# Patient Record
Sex: MALE | Race: WHITE | NOT HISPANIC OR LATINO | Employment: UNEMPLOYED | URBAN - METROPOLITAN AREA
[De-identification: names, ages, dates, MRNs, and addresses within clinical notes are randomized per-mention and may not be internally consistent; named-entity substitution may affect disease eponyms.]

---

## 2021-07-21 ENCOUNTER — TRANSCRIBE ORDERS (OUTPATIENT)
Dept: ADMINISTRATIVE | Facility: HOSPITAL | Age: 66
End: 2021-07-21

## 2021-07-21 ENCOUNTER — LAB (OUTPATIENT)
Dept: LAB | Facility: HOSPITAL | Age: 66
End: 2021-07-21

## 2021-07-21 DIAGNOSIS — E55.9 VITAMIN D DEFICIENCY: ICD-10-CM

## 2021-07-21 DIAGNOSIS — Z13.220 ENCOUNTER FOR SCREENING FOR LIPOID DISORDERS: ICD-10-CM

## 2021-07-21 DIAGNOSIS — R53.83 OTHER FATIGUE: Primary | ICD-10-CM

## 2021-07-21 DIAGNOSIS — R53.83 OTHER FATIGUE: ICD-10-CM

## 2021-07-21 DIAGNOSIS — Z12.5 ENCOUNTER FOR SCREENING FOR MALIGNANT NEOPLASM OF PROSTATE: ICD-10-CM

## 2021-07-21 LAB
25(OH)D3 SERPL-MCNC: 27.8 NG/ML
ALBUMIN SERPL-MCNC: 3.6 G/DL (ref 3.5–5.2)
ALBUMIN/GLOB SERPL: 1.2 G/DL
ALP SERPL-CCNC: 115 U/L (ref 39–117)
ALT SERPL W P-5'-P-CCNC: 10 U/L (ref 1–41)
ANION GAP SERPL CALCULATED.3IONS-SCNC: 10.3 MMOL/L (ref 5–15)
AST SERPL-CCNC: 20 U/L (ref 1–40)
BASOPHILS # BLD AUTO: 0.08 10*3/MM3 (ref 0–0.2)
BASOPHILS NFR BLD AUTO: 1.2 % (ref 0–1.5)
BILIRUB SERPL-MCNC: 0.3 MG/DL (ref 0–1.2)
BUN SERPL-MCNC: 12 MG/DL (ref 8–23)
BUN/CREAT SERPL: 18.2 (ref 7–25)
CALCIUM SPEC-SCNC: 8.6 MG/DL (ref 8.6–10.5)
CHLORIDE SERPL-SCNC: 100 MMOL/L (ref 98–107)
CHOLEST SERPL-MCNC: 165 MG/DL (ref 0–200)
CO2 SERPL-SCNC: 25.7 MMOL/L (ref 22–29)
CREAT SERPL-MCNC: 0.66 MG/DL (ref 0.76–1.27)
DEPRECATED RDW RBC AUTO: 45.5 FL (ref 37–54)
EOSINOPHIL # BLD AUTO: 0.19 10*3/MM3 (ref 0–0.4)
EOSINOPHIL NFR BLD AUTO: 2.9 % (ref 0.3–6.2)
ERYTHROCYTE [DISTWIDTH] IN BLOOD BY AUTOMATED COUNT: 13.5 % (ref 12.3–15.4)
GFR SERPL CREATININE-BSD FRML MDRD: 121 ML/MIN/1.73
GLOBULIN UR ELPH-MCNC: 3 GM/DL
GLUCOSE SERPL-MCNC: 82 MG/DL (ref 65–99)
HCT VFR BLD AUTO: 46.8 % (ref 37.5–51)
HDLC SERPL-MCNC: 67 MG/DL (ref 40–60)
HGB BLD-MCNC: 15.1 G/DL (ref 13–17.7)
IMM GRANULOCYTES # BLD AUTO: 0.03 10*3/MM3 (ref 0–0.05)
IMM GRANULOCYTES NFR BLD AUTO: 0.5 % (ref 0–0.5)
LDLC SERPL CALC-MCNC: 80 MG/DL (ref 0–100)
LDLC/HDLC SERPL: 1.16 {RATIO}
LYMPHOCYTES # BLD AUTO: 1.47 10*3/MM3 (ref 0.7–3.1)
LYMPHOCYTES NFR BLD AUTO: 22.7 % (ref 19.6–45.3)
MCH RBC QN AUTO: 29.1 PG (ref 26.6–33)
MCHC RBC AUTO-ENTMCNC: 32.3 G/DL (ref 31.5–35.7)
MCV RBC AUTO: 90.2 FL (ref 79–97)
MONOCYTES # BLD AUTO: 0.58 10*3/MM3 (ref 0.1–0.9)
MONOCYTES NFR BLD AUTO: 9 % (ref 5–12)
NEUTROPHILS NFR BLD AUTO: 4.12 10*3/MM3 (ref 1.7–7)
NEUTROPHILS NFR BLD AUTO: 63.7 % (ref 42.7–76)
NRBC BLD AUTO-RTO: 0 /100 WBC (ref 0–0.2)
PLATELET # BLD AUTO: 218 10*3/MM3 (ref 140–450)
PMV BLD AUTO: 11 FL (ref 6–12)
POTASSIUM SERPL-SCNC: 4.3 MMOL/L (ref 3.5–5.2)
PROT SERPL-MCNC: 6.6 G/DL (ref 6–8.5)
PSA SERPL-MCNC: 0.24 NG/ML (ref 0–4)
RBC # BLD AUTO: 5.19 10*6/MM3 (ref 4.14–5.8)
SODIUM SERPL-SCNC: 136 MMOL/L (ref 136–145)
T4 FREE SERPL-MCNC: 1.53 NG/DL (ref 0.93–1.7)
TRIGL SERPL-MCNC: 103 MG/DL (ref 0–150)
TSH SERPL DL<=0.05 MIU/L-ACNC: 0.74 UIU/ML (ref 0.27–4.2)
VIT B12 BLD-MCNC: 505 PG/ML (ref 211–946)
VLDLC SERPL-MCNC: 18 MG/DL (ref 5–40)
WBC # BLD AUTO: 6.47 10*3/MM3 (ref 3.4–10.8)

## 2021-07-21 PROCEDURE — 84443 ASSAY THYROID STIM HORMONE: CPT

## 2021-07-21 PROCEDURE — 80061 LIPID PANEL: CPT

## 2021-07-21 PROCEDURE — G0103 PSA SCREENING: HCPCS

## 2021-07-21 PROCEDURE — 84439 ASSAY OF FREE THYROXINE: CPT

## 2021-07-21 PROCEDURE — 82607 VITAMIN B-12: CPT

## 2021-07-21 PROCEDURE — 36415 COLL VENOUS BLD VENIPUNCTURE: CPT

## 2021-07-21 PROCEDURE — 82306 VITAMIN D 25 HYDROXY: CPT

## 2021-07-21 PROCEDURE — 85025 COMPLETE CBC W/AUTO DIFF WBC: CPT

## 2021-07-21 PROCEDURE — 80053 COMPREHEN METABOLIC PANEL: CPT

## 2021-12-26 ENCOUNTER — HOSPITAL ENCOUNTER (EMERGENCY)
Facility: HOSPITAL | Age: 66
Discharge: HOME | End: 2021-12-27
Attending: EMERGENCY MEDICINE | Admitting: EMERGENCY MEDICINE

## 2021-12-26 DIAGNOSIS — T83.090A: Primary | ICD-10-CM

## 2021-12-26 PROCEDURE — 99283 EMERGENCY DEPT VISIT LOW MDM: CPT

## 2021-12-27 VITALS
OXYGEN SATURATION: 97 % | HEART RATE: 68 BPM | SYSTOLIC BLOOD PRESSURE: 128 MMHG | DIASTOLIC BLOOD PRESSURE: 68 MMHG | WEIGHT: 140 LBS | BODY MASS INDEX: 23.9 KG/M2 | TEMPERATURE: 98.3 F | HEIGHT: 64 IN | RESPIRATION RATE: 17 BRPM

## 2022-08-16 ENCOUNTER — APPOINTMENT (OUTPATIENT)
Dept: GENERAL RADIOLOGY | Facility: HOSPITAL | Age: 67
End: 2022-08-16

## 2022-08-16 ENCOUNTER — APPOINTMENT (OUTPATIENT)
Dept: CARDIOLOGY | Facility: HOSPITAL | Age: 67
End: 2022-08-16

## 2022-08-16 ENCOUNTER — HOSPITAL ENCOUNTER (INPATIENT)
Facility: HOSPITAL | Age: 67
LOS: 2 days | Discharge: NURSING, REHAB OR LONG-TERM CARE | End: 2022-08-19
Attending: EMERGENCY MEDICINE | Admitting: INTERNAL MEDICINE

## 2022-08-16 DIAGNOSIS — I50.9 ACUTE CONGESTIVE HEART FAILURE, UNSPECIFIED HEART FAILURE TYPE: ICD-10-CM

## 2022-08-16 DIAGNOSIS — J96.01 ACUTE HYPOXEMIC RESPIRATORY FAILURE: Primary | ICD-10-CM

## 2022-08-16 DIAGNOSIS — N39.0 ACUTE UTI: ICD-10-CM

## 2022-08-16 PROBLEM — K21.9 GASTROESOPHAGEAL REFLUX DISEASE WITHOUT ESOPHAGITIS: Chronic | Status: ACTIVE | Noted: 2021-09-17

## 2022-08-16 PROBLEM — D64.9 ANEMIA: Status: ACTIVE | Noted: 2022-08-16

## 2022-08-16 PROBLEM — Z93.59 PRESENCE OF SUPRAPUBIC CATHETER (HCC): Status: ACTIVE | Noted: 2022-02-07

## 2022-08-16 PROBLEM — K21.9 GASTROESOPHAGEAL REFLUX DISEASE WITHOUT ESOPHAGITIS: Status: ACTIVE | Noted: 2021-09-17

## 2022-08-16 PROBLEM — G82.50 TETRAPLEGIA: Chronic | Status: ACTIVE | Noted: 2021-09-17

## 2022-08-16 PROBLEM — E87.0 HYPERNATREMIA: Status: ACTIVE | Noted: 2022-08-16

## 2022-08-16 PROBLEM — E55.9 VITAMIN D DEFICIENCY: Status: ACTIVE | Noted: 2021-09-17

## 2022-08-16 PROBLEM — G82.50 TETRAPLEGIA (HCC): Status: ACTIVE | Noted: 2021-09-17

## 2022-08-16 LAB
ALBUMIN SERPL-MCNC: 3.4 G/DL (ref 3.5–5.2)
ALBUMIN/GLOB SERPL: 1.1 G/DL
ALP SERPL-CCNC: 124 U/L (ref 39–117)
ALT SERPL W P-5'-P-CCNC: 17 U/L (ref 1–41)
ANION GAP SERPL CALCULATED.3IONS-SCNC: 7 MMOL/L (ref 5–15)
ARTERIAL PATENCY WRIST A: POSITIVE
AST SERPL-CCNC: 17 U/L (ref 1–40)
ATMOSPHERIC PRESS: ABNORMAL MM[HG]
B PARAPERT DNA SPEC QL NAA+PROBE: NOT DETECTED
B PERT DNA SPEC QL NAA+PROBE: NOT DETECTED
BACTERIA UR QL AUTO: ABNORMAL /HPF
BASE EXCESS BLDA CALC-SCNC: 11.7 MMOL/L (ref 0–3)
BASOPHILS # BLD AUTO: 0 10*3/MM3 (ref 0–0.2)
BASOPHILS NFR BLD AUTO: 0.3 % (ref 0–1.5)
BDY SITE: ABNORMAL
BH CV ECHO MEAS - AO MAX PG: 8.2 MMHG
BH CV ECHO MEAS - AO MEAN PG: 4.6 MMHG
BH CV ECHO MEAS - AO ROOT DIAM: 3.5 CM
BH CV ECHO MEAS - AO V2 MAX: 143.5 CM/SEC
BH CV ECHO MEAS - AO V2 VTI: 24.9 CM
BH CV ECHO MEAS - AVA(I,D): 2.24 CM2
BH CV ECHO MEAS - EDV(MOD-SP4): 80.1 ML
BH CV ECHO MEAS - EF(MOD-BP): 63 %
BH CV ECHO MEAS - EF(MOD-SP4): 62.6 %
BH CV ECHO MEAS - ESV(MOD-SP4): 29.9 ML
BH CV ECHO MEAS - LA DIMENSION: 3.3 CM
BH CV ECHO MEAS - LV DIASTOLIC VOL/BSA (35-75): 46.1 CM2
BH CV ECHO MEAS - LV MAX PG: 2.46 MMHG
BH CV ECHO MEAS - LV MEAN PG: 1.58 MMHG
BH CV ECHO MEAS - LV SYSTOLIC VOL/BSA (12-30): 17.2 CM2
BH CV ECHO MEAS - LV V1 MAX: 78.4 CM/SEC
BH CV ECHO MEAS - LV V1 VTI: 15.9 CM
BH CV ECHO MEAS - LVOT AREA: 3.5 CM2
BH CV ECHO MEAS - LVOT DIAM: 2.11 CM
BH CV ECHO MEAS - MV A MAX VEL: 70.8 CM/SEC
BH CV ECHO MEAS - MV DEC SLOPE: 340.4 CM/SEC2
BH CV ECHO MEAS - MV DEC TIME: 0.15 MSEC
BH CV ECHO MEAS - MV E MAX VEL: 52.3 CM/SEC
BH CV ECHO MEAS - MV E/A: 0.74
BH CV ECHO MEAS - MV MAX PG: 4.8 MMHG
BH CV ECHO MEAS - MV MEAN PG: 1.63 MMHG
BH CV ECHO MEAS - MV V2 VTI: 19.2 CM
BH CV ECHO MEAS - MVA(VTI): 2.9 CM2
BH CV ECHO MEAS - PA V2 MAX: 114.3 CM/SEC
BH CV ECHO MEAS - RAP SYSTOLE: 15 MMHG
BH CV ECHO MEAS - RV MAX PG: 1.03 MMHG
BH CV ECHO MEAS - RV V1 MAX: 50.8 CM/SEC
BH CV ECHO MEAS - RV V1 VTI: 10.6 CM
BH CV ECHO MEAS - RVSP: 45.7 MMHG
BH CV ECHO MEAS - SI(MOD-SP4): 28.9 ML/M2
BH CV ECHO MEAS - SV(LVOT): 55.9 ML
BH CV ECHO MEAS - SV(MOD-SP4): 50.2 ML
BH CV ECHO MEAS - TR MAX PG: 30.7 MMHG
BH CV ECHO MEAS - TR MAX VEL: 277 CM/SEC
BILIRUB SERPL-MCNC: 0.3 MG/DL (ref 0–1.2)
BILIRUB UR QL STRIP: NEGATIVE
BUN SERPL-MCNC: 18 MG/DL (ref 8–23)
BUN/CREAT SERPL: 25.7 (ref 7–25)
C PNEUM DNA NPH QL NAA+NON-PROBE: NOT DETECTED
CA-I BLDA-SCNC: 1.23 MMOL/L (ref 1.15–1.33)
CALCIUM SPEC-SCNC: 9.2 MG/DL (ref 8.6–10.5)
CHLORIDE SERPL-SCNC: 99 MMOL/L (ref 98–107)
CLARITY UR: ABNORMAL
CO2 BLDA-SCNC: 43.2 MMOL/L (ref 22–29)
CO2 SERPL-SCNC: 41 MMOL/L (ref 22–29)
COLOR UR: YELLOW
CREAT SERPL-MCNC: 0.7 MG/DL (ref 0.76–1.27)
CRP SERPL-MCNC: 1.39 MG/DL (ref 0–0.5)
D-LACTATE SERPL-SCNC: 0.6 MMOL/L (ref 0.5–2)
D-LACTATE SERPL-SCNC: <0.3 MMOL/L (ref 0.5–2)
DEPRECATED RDW RBC AUTO: 43.8 FL (ref 37–54)
EGFRCR SERPLBLD CKD-EPI 2021: 101.6 ML/MIN/1.73
EOSINOPHIL # BLD AUTO: 0.1 10*3/MM3 (ref 0–0.4)
EOSINOPHIL NFR BLD AUTO: 0.7 % (ref 0.3–6.2)
ERYTHROCYTE [DISTWIDTH] IN BLOOD BY AUTOMATED COUNT: 15.4 % (ref 12.3–15.4)
FERRITIN SERPL-MCNC: 9.49 NG/ML (ref 30–400)
FLUAV SUBTYP SPEC NAA+PROBE: NOT DETECTED
FLUBV RNA ISLT QL NAA+PROBE: NOT DETECTED
FOLATE SERPL-MCNC: >20 NG/ML (ref 4.78–24.2)
GLOBULIN UR ELPH-MCNC: 3.2 GM/DL
GLUCOSE BLDC GLUCOMTR-MCNC: 80 MG/DL (ref 74–100)
GLUCOSE BLDC GLUCOMTR-MCNC: 80 MG/DL (ref 74–100)
GLUCOSE SERPL-MCNC: 80 MG/DL (ref 65–99)
GLUCOSE UR STRIP-MCNC: NEGATIVE MG/DL
HADV DNA SPEC NAA+PROBE: NOT DETECTED
HCO3 BLDA-SCNC: 40.8 MMOL/L (ref 21–28)
HCOV 229E RNA SPEC QL NAA+PROBE: NOT DETECTED
HCOV HKU1 RNA SPEC QL NAA+PROBE: NOT DETECTED
HCOV NL63 RNA SPEC QL NAA+PROBE: NOT DETECTED
HCOV OC43 RNA SPEC QL NAA+PROBE: NOT DETECTED
HCT VFR BLD AUTO: 35.7 % (ref 37.5–51)
HCT VFR BLDA CALC: 38 % (ref 38–51)
HEMODILUTION: NO
HGB BLD-MCNC: 10.9 G/DL (ref 13–17.7)
HGB BLDA-MCNC: 12.9 G/DL (ref 12–17)
HGB UR QL STRIP.AUTO: NEGATIVE
HMPV RNA NPH QL NAA+NON-PROBE: NOT DETECTED
HOLD SPECIMEN: NORMAL
HPIV1 RNA ISLT QL NAA+PROBE: NOT DETECTED
HPIV2 RNA SPEC QL NAA+PROBE: NOT DETECTED
HPIV3 RNA NPH QL NAA+PROBE: NOT DETECTED
HPIV4 P GENE NPH QL NAA+PROBE: NOT DETECTED
HYALINE CASTS UR QL AUTO: ABNORMAL /LPF
INHALED O2 CONCENTRATION: 32 %
IRON 24H UR-MRATE: 24 MCG/DL (ref 59–158)
KETONES UR QL STRIP: ABNORMAL
L PNEUMO1 AG UR QL IA: NEGATIVE
LEUKOCYTE ESTERASE UR QL STRIP.AUTO: ABNORMAL
LYMPHOCYTES # BLD AUTO: 1.7 10*3/MM3 (ref 0.7–3.1)
LYMPHOCYTES NFR BLD AUTO: 22 % (ref 19.6–45.3)
M PNEUMO IGG SER IA-ACNC: NOT DETECTED
MAXIMAL PREDICTED HEART RATE: 154 BPM
MCH RBC QN AUTO: 24.1 PG (ref 26.6–33)
MCHC RBC AUTO-ENTMCNC: 30.5 G/DL (ref 31.5–35.7)
MCV RBC AUTO: 79 FL (ref 79–97)
MODALITY: ABNORMAL
MONOCYTES # BLD AUTO: 1 10*3/MM3 (ref 0.1–0.9)
MONOCYTES NFR BLD AUTO: 12.6 % (ref 5–12)
MUCOUS THREADS URNS QL MICRO: ABNORMAL /HPF
NEUTROPHILS NFR BLD AUTO: 4.9 10*3/MM3 (ref 1.7–7)
NEUTROPHILS NFR BLD AUTO: 64.4 % (ref 42.7–76)
NITRITE UR QL STRIP: NEGATIVE
NRBC BLD AUTO-RTO: 0 /100 WBC (ref 0–0.2)
NT-PROBNP SERPL-MCNC: 2386 PG/ML (ref 0–900)
NT-PROBNP SERPL-MCNC: 2525 PG/ML (ref 0–900)
OSMOLALITY SERPL: 301 MOSM/KG (ref 280–301)
OSMOLALITY UR: 442 MOSM/KG (ref 300–800)
PCO2 BLDA: 76.7 MM HG (ref 35–48)
PH BLDA: 7.33 PH UNITS (ref 7.35–7.45)
PH UR STRIP.AUTO: 6 [PH] (ref 5–8)
PLATELET # BLD AUTO: 232 10*3/MM3 (ref 140–450)
PMV BLD AUTO: 8.7 FL (ref 6–12)
PO2 BLDA: 82.9 MM HG (ref 83–108)
POTASSIUM BLDA-SCNC: 3.9 MMOL/L (ref 3.5–4.5)
POTASSIUM SERPL-SCNC: 4.3 MMOL/L (ref 3.5–5.2)
PROCALCITONIN SERPL-MCNC: 0.05 NG/ML (ref 0–0.25)
PROT SERPL-MCNC: 6.6 G/DL (ref 6–8.5)
PROT UR QL STRIP: NEGATIVE
QT INTERVAL: 381 MS
RBC # BLD AUTO: 4.52 10*6/MM3 (ref 4.14–5.8)
RBC # UR STRIP: ABNORMAL /HPF
REF LAB TEST METHOD: ABNORMAL
RHINOVIRUS RNA SPEC NAA+PROBE: NOT DETECTED
RSV RNA NPH QL NAA+NON-PROBE: NOT DETECTED
S PNEUM AG SPEC QL LA: NEGATIVE
SAO2 % BLDCOA: 94.6 % (ref 94–98)
SARS-COV-2 RNA NPH QL NAA+NON-PROBE: NOT DETECTED
SODIUM BLD-SCNC: 145 MMOL/L (ref 138–146)
SODIUM SERPL-SCNC: 147 MMOL/L (ref 136–145)
SODIUM UR-SCNC: 157 MMOL/L
SP GR UR STRIP: 1.02 (ref 1–1.03)
SQUAMOUS #/AREA URNS HPF: ABNORMAL /HPF
STRESS TARGET HR: 131 BPM
TROPONIN T SERPL-MCNC: <0.01 NG/ML (ref 0–0.03)
UROBILINOGEN UR QL STRIP: ABNORMAL
VIT B12 BLD-MCNC: 570 PG/ML (ref 211–946)
WBC # UR STRIP: ABNORMAL /HPF
WBC NRBC COR # BLD: 7.6 10*3/MM3 (ref 3.4–10.8)
WHOLE BLOOD HOLD COAG: NORMAL
WHOLE BLOOD HOLD COAG: NORMAL

## 2022-08-16 PROCEDURE — 83880 ASSAY OF NATRIURETIC PEPTIDE: CPT | Performed by: NURSE PRACTITIONER

## 2022-08-16 PROCEDURE — 92610 EVALUATE SWALLOWING FUNCTION: CPT

## 2022-08-16 PROCEDURE — 85018 HEMOGLOBIN: CPT

## 2022-08-16 PROCEDURE — 82746 ASSAY OF FOLIC ACID SERUM: CPT | Performed by: NURSE PRACTITIONER

## 2022-08-16 PROCEDURE — 0202U NFCT DS 22 TRGT SARS-COV-2: CPT | Performed by: EMERGENCY MEDICINE

## 2022-08-16 PROCEDURE — 87186 SC STD MICRODIL/AGAR DIL: CPT | Performed by: EMERGENCY MEDICINE

## 2022-08-16 PROCEDURE — 25010000002 ENOXAPARIN PER 10 MG: Performed by: NURSE PRACTITIONER

## 2022-08-16 PROCEDURE — 86140 C-REACTIVE PROTEIN: CPT | Performed by: NURSE PRACTITIONER

## 2022-08-16 PROCEDURE — 99285 EMERGENCY DEPT VISIT HI MDM: CPT

## 2022-08-16 PROCEDURE — 93005 ELECTROCARDIOGRAM TRACING: CPT | Performed by: EMERGENCY MEDICINE

## 2022-08-16 PROCEDURE — 83880 ASSAY OF NATRIURETIC PEPTIDE: CPT | Performed by: EMERGENCY MEDICINE

## 2022-08-16 PROCEDURE — 82728 ASSAY OF FERRITIN: CPT | Performed by: NURSE PRACTITIONER

## 2022-08-16 PROCEDURE — G0378 HOSPITAL OBSERVATION PER HR: HCPCS

## 2022-08-16 PROCEDURE — 36415 COLL VENOUS BLD VENIPUNCTURE: CPT | Performed by: EMERGENCY MEDICINE

## 2022-08-16 PROCEDURE — 92611 MOTION FLUOROSCOPY/SWALLOW: CPT

## 2022-08-16 PROCEDURE — 93005 ELECTROCARDIOGRAM TRACING: CPT

## 2022-08-16 PROCEDURE — 93306 TTE W/DOPPLER COMPLETE: CPT | Performed by: INTERNAL MEDICINE

## 2022-08-16 PROCEDURE — 84145 PROCALCITONIN (PCT): CPT | Performed by: EMERGENCY MEDICINE

## 2022-08-16 PROCEDURE — 93306 TTE W/DOPPLER COMPLETE: CPT

## 2022-08-16 PROCEDURE — 87449 NOS EACH ORGANISM AG IA: CPT | Performed by: NURSE PRACTITIONER

## 2022-08-16 PROCEDURE — 25010000002 CEFTRIAXONE PER 250 MG: Performed by: NURSE PRACTITIONER

## 2022-08-16 PROCEDURE — 36600 WITHDRAWAL OF ARTERIAL BLOOD: CPT

## 2022-08-16 PROCEDURE — 83605 ASSAY OF LACTIC ACID: CPT

## 2022-08-16 PROCEDURE — 25010000002 FUROSEMIDE PER 20 MG: Performed by: NURSE PRACTITIONER

## 2022-08-16 PROCEDURE — 87147 CULTURE TYPE IMMUNOLOGIC: CPT | Performed by: EMERGENCY MEDICINE

## 2022-08-16 PROCEDURE — 84484 ASSAY OF TROPONIN QUANT: CPT | Performed by: EMERGENCY MEDICINE

## 2022-08-16 PROCEDURE — 83540 ASSAY OF IRON: CPT | Performed by: NURSE PRACTITIONER

## 2022-08-16 PROCEDURE — 82803 BLOOD GASES ANY COMBINATION: CPT

## 2022-08-16 PROCEDURE — 25010000002 PIPERACILLIN SOD-TAZOBACTAM PER 1 G: Performed by: EMERGENCY MEDICINE

## 2022-08-16 PROCEDURE — 36415 COLL VENOUS BLD VENIPUNCTURE: CPT

## 2022-08-16 PROCEDURE — 25010000002 FUROSEMIDE PER 20 MG: Performed by: EMERGENCY MEDICINE

## 2022-08-16 PROCEDURE — 87040 BLOOD CULTURE FOR BACTERIA: CPT | Performed by: EMERGENCY MEDICINE

## 2022-08-16 PROCEDURE — 81001 URINALYSIS AUTO W/SCOPE: CPT | Performed by: EMERGENCY MEDICINE

## 2022-08-16 PROCEDURE — 83930 ASSAY OF BLOOD OSMOLALITY: CPT | Performed by: NURSE PRACTITIONER

## 2022-08-16 PROCEDURE — 71045 X-RAY EXAM CHEST 1 VIEW: CPT

## 2022-08-16 PROCEDURE — 84300 ASSAY OF URINE SODIUM: CPT | Performed by: NURSE PRACTITIONER

## 2022-08-16 PROCEDURE — 87086 URINE CULTURE/COLONY COUNT: CPT | Performed by: EMERGENCY MEDICINE

## 2022-08-16 PROCEDURE — 82330 ASSAY OF CALCIUM: CPT

## 2022-08-16 PROCEDURE — 87150 DNA/RNA AMPLIFIED PROBE: CPT | Performed by: EMERGENCY MEDICINE

## 2022-08-16 PROCEDURE — 74230 X-RAY XM SWLNG FUNCJ C+: CPT

## 2022-08-16 PROCEDURE — 80051 ELECTROLYTE PANEL: CPT

## 2022-08-16 PROCEDURE — P9612 CATHETERIZE FOR URINE SPEC: HCPCS

## 2022-08-16 PROCEDURE — 82607 VITAMIN B-12: CPT | Performed by: NURSE PRACTITIONER

## 2022-08-16 PROCEDURE — 99223 1ST HOSP IP/OBS HIGH 75: CPT | Performed by: INTERNAL MEDICINE

## 2022-08-16 PROCEDURE — 85025 COMPLETE CBC W/AUTO DIFF WBC: CPT | Performed by: EMERGENCY MEDICINE

## 2022-08-16 PROCEDURE — 82962 GLUCOSE BLOOD TEST: CPT

## 2022-08-16 PROCEDURE — 84484 ASSAY OF TROPONIN QUANT: CPT | Performed by: NURSE PRACTITIONER

## 2022-08-16 PROCEDURE — 83935 ASSAY OF URINE OSMOLALITY: CPT | Performed by: NURSE PRACTITIONER

## 2022-08-16 PROCEDURE — 80053 COMPREHEN METABOLIC PANEL: CPT | Performed by: EMERGENCY MEDICINE

## 2022-08-16 RX ORDER — ACETAMINOPHEN 650 MG/1
650 SUPPOSITORY RECTAL EVERY 4 HOURS PRN
Status: DISCONTINUED | OUTPATIENT
Start: 2022-08-16 | End: 2022-08-16 | Stop reason: SDUPTHER

## 2022-08-16 RX ORDER — ONDANSETRON 2 MG/ML
4 INJECTION INTRAMUSCULAR; INTRAVENOUS EVERY 6 HOURS PRN
Status: DISCONTINUED | OUTPATIENT
Start: 2022-08-16 | End: 2022-08-19 | Stop reason: HOSPADM

## 2022-08-16 RX ORDER — SODIUM CHLORIDE 0.9 % (FLUSH) 0.9 %
10 SYRINGE (ML) INJECTION AS NEEDED
Status: DISCONTINUED | OUTPATIENT
Start: 2022-08-16 | End: 2022-08-19 | Stop reason: HOSPADM

## 2022-08-16 RX ORDER — ENOXAPARIN SODIUM 100 MG/ML
40 INJECTION SUBCUTANEOUS DAILY
Status: DISCONTINUED | OUTPATIENT
Start: 2022-08-16 | End: 2022-08-19 | Stop reason: HOSPADM

## 2022-08-16 RX ORDER — SODIUM CHLORIDE 0.9 % (FLUSH) 0.9 %
10 SYRINGE (ML) INJECTION AS NEEDED
Status: DISCONTINUED | OUTPATIENT
Start: 2022-08-16 | End: 2022-08-16

## 2022-08-16 RX ORDER — SODIUM CHLORIDE 0.9 % (FLUSH) 0.9 %
10 SYRINGE (ML) INJECTION EVERY 12 HOURS SCHEDULED
Status: DISCONTINUED | OUTPATIENT
Start: 2022-08-16 | End: 2022-08-16

## 2022-08-16 RX ORDER — SODIUM CHLORIDE 0.9 % (FLUSH) 0.9 %
10 SYRINGE (ML) INJECTION EVERY 12 HOURS SCHEDULED
Status: DISCONTINUED | OUTPATIENT
Start: 2022-08-16 | End: 2022-08-19 | Stop reason: HOSPADM

## 2022-08-16 RX ORDER — ZINC OXIDE
1 OINTMENT (GRAM) TOPICAL 2 TIMES DAILY
COMMUNITY

## 2022-08-16 RX ORDER — FUROSEMIDE 10 MG/ML
40 INJECTION INTRAMUSCULAR; INTRAVENOUS ONCE
Status: COMPLETED | OUTPATIENT
Start: 2022-08-16 | End: 2022-08-16

## 2022-08-16 RX ORDER — CHOLECALCIFEROL (VITAMIN D3) 125 MCG
5 CAPSULE ORAL NIGHTLY PRN
Status: DISCONTINUED | OUTPATIENT
Start: 2022-08-16 | End: 2022-08-19 | Stop reason: HOSPADM

## 2022-08-16 RX ORDER — ONDANSETRON 4 MG/1
4 TABLET, FILM COATED ORAL EVERY 6 HOURS PRN
Status: DISCONTINUED | OUTPATIENT
Start: 2022-08-16 | End: 2022-08-19 | Stop reason: HOSPADM

## 2022-08-16 RX ORDER — NITROGLYCERIN 0.4 MG/1
0.4 TABLET SUBLINGUAL
Status: DISCONTINUED | OUTPATIENT
Start: 2022-08-16 | End: 2022-08-19 | Stop reason: HOSPADM

## 2022-08-16 RX ORDER — IPRATROPIUM BROMIDE AND ALBUTEROL SULFATE 2.5; .5 MG/3ML; MG/3ML
3 SOLUTION RESPIRATORY (INHALATION) EVERY 4 HOURS PRN
Status: DISCONTINUED | OUTPATIENT
Start: 2022-08-16 | End: 2022-08-19 | Stop reason: HOSPADM

## 2022-08-16 RX ORDER — PANTOPRAZOLE SODIUM 40 MG/10ML
40 INJECTION, POWDER, LYOPHILIZED, FOR SOLUTION INTRAVENOUS
Status: DISCONTINUED | OUTPATIENT
Start: 2022-08-16 | End: 2022-08-17

## 2022-08-16 RX ORDER — ACETAMINOPHEN 160 MG/5ML
650 SOLUTION ORAL EVERY 4 HOURS PRN
Status: DISCONTINUED | OUTPATIENT
Start: 2022-08-16 | End: 2022-08-16 | Stop reason: SDUPTHER

## 2022-08-16 RX ORDER — ALUMINA, MAGNESIA, AND SIMETHICONE 2400; 2400; 240 MG/30ML; MG/30ML; MG/30ML
15 SUSPENSION ORAL EVERY 6 HOURS PRN
Status: DISCONTINUED | OUTPATIENT
Start: 2022-08-16 | End: 2022-08-19 | Stop reason: HOSPADM

## 2022-08-16 RX ORDER — ACETAMINOPHEN 650 MG/1
650 SUPPOSITORY RECTAL EVERY 4 HOURS PRN
Status: DISCONTINUED | OUTPATIENT
Start: 2022-08-16 | End: 2022-08-19 | Stop reason: HOSPADM

## 2022-08-16 RX ORDER — OMEPRAZOLE 20 MG/1
20 CAPSULE, DELAYED RELEASE ORAL 2 TIMES DAILY
COMMUNITY

## 2022-08-16 RX ORDER — ACETAMINOPHEN 325 MG/1
650 TABLET ORAL EVERY 4 HOURS PRN
Status: DISCONTINUED | OUTPATIENT
Start: 2022-08-16 | End: 2022-08-19 | Stop reason: HOSPADM

## 2022-08-16 RX ORDER — ACETAMINOPHEN 325 MG/1
650 TABLET ORAL EVERY 4 HOURS PRN
Status: DISCONTINUED | OUTPATIENT
Start: 2022-08-16 | End: 2022-08-16 | Stop reason: SDUPTHER

## 2022-08-16 RX ORDER — IPRATROPIUM BROMIDE AND ALBUTEROL SULFATE 2.5; .5 MG/3ML; MG/3ML
3 SOLUTION RESPIRATORY (INHALATION)
COMMUNITY

## 2022-08-16 RX ORDER — ACETAMINOPHEN 160 MG/5ML
650 SOLUTION ORAL EVERY 4 HOURS PRN
Status: DISCONTINUED | OUTPATIENT
Start: 2022-08-16 | End: 2022-08-19 | Stop reason: HOSPADM

## 2022-08-16 RX ORDER — ONDANSETRON 2 MG/ML
4 INJECTION INTRAMUSCULAR; INTRAVENOUS EVERY 6 HOURS PRN
Status: DISCONTINUED | OUTPATIENT
Start: 2022-08-16 | End: 2022-08-16 | Stop reason: SDUPTHER

## 2022-08-16 RX ORDER — BISACODYL 10 MG
10 SUPPOSITORY, RECTAL RECTAL DAILY PRN
Status: DISCONTINUED | OUTPATIENT
Start: 2022-08-16 | End: 2022-08-19 | Stop reason: HOSPADM

## 2022-08-16 RX ORDER — ONDANSETRON 4 MG/1
4 TABLET, FILM COATED ORAL EVERY 6 HOURS PRN
Status: DISCONTINUED | OUTPATIENT
Start: 2022-08-16 | End: 2022-08-16 | Stop reason: SDUPTHER

## 2022-08-16 RX ORDER — FUROSEMIDE 10 MG/ML
40 INJECTION INTRAMUSCULAR; INTRAVENOUS EVERY 8 HOURS
Status: DISPENSED | OUTPATIENT
Start: 2022-08-16 | End: 2022-08-17

## 2022-08-16 RX ORDER — BISACODYL 10 MG
10 SUPPOSITORY, RECTAL RECTAL DAILY PRN
COMMUNITY

## 2022-08-16 RX ADMIN — FUROSEMIDE 40 MG: 10 INJECTION, SOLUTION INTRAMUSCULAR; INTRAVENOUS at 04:20

## 2022-08-16 RX ADMIN — BARIUM SULFATE 50 ML: 400 SUSPENSION ORAL at 13:30

## 2022-08-16 RX ADMIN — ENOXAPARIN SODIUM 40 MG: 100 INJECTION SUBCUTANEOUS at 16:20

## 2022-08-16 RX ADMIN — CEFTRIAXONE 1 G: 1 INJECTION, POWDER, FOR SOLUTION INTRAMUSCULAR; INTRAVENOUS at 10:35

## 2022-08-16 RX ADMIN — FUROSEMIDE 40 MG: 10 INJECTION, SOLUTION INTRAMUSCULAR; INTRAVENOUS at 14:03

## 2022-08-16 RX ADMIN — PIPERACILLIN AND TAZOBACTAM 3.38 G: 3; .375 INJECTION, POWDER, FOR SOLUTION INTRAVENOUS at 05:24
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
## 2022-08-16 NOTE — PLAN OF CARE
Goal Outcome Evaluation:  VFSS completed this date. Pt observed upright in the lateral projection and was given trials of NTL by spoon x1, by straw x2, thin liquids by spoon x1, by straw x2, puree x2, and mechanical ground (mixed) x2. Pt required feeding assistance from SLP coordinator w/ all trials.    Recommend pt initiate a mechanical ground and thin liquid diet. Pt should follow strict aspiration precautions and be a full feed. ST will continue to follow to ensure diet tolerance and make further recs as indicated

## 2022-08-16 NOTE — PLAN OF CARE
Problem: Fall Injury Risk  Goal: Absence of Fall and Fall-Related Injury  Outcome: Ongoing, Progressing     Problem: Pain Acute  Goal: Acceptable Pain Control and Functional Ability  Outcome: Ongoing, Progressing     Problem: Chest Pain  Goal: Resolution of Chest Pain Symptoms  Outcome: Ongoing, Progressing   Goal Outcome Evaluation:      Patient admitted to cdu bed 4, patient awaiting on inpatient room

## 2022-08-16 NOTE — ED NOTES
"RN at bedside to check patient monitor and place pt back on monitor. Pt told RN to \"f--- off\". RN stepped out of room but verified pt was talking and breathing.   "

## 2022-08-16 NOTE — ED NOTES
"RN noted oxygen sats were 75%. Pt nasal cannula was off. RN asked if she could put cannula back on patient and he said \"no\" Pt asking to go home. Pt asking when he can leave. Pt educated that his oxygen is 75% and educated on the risks of low oxygen. Pt agreed to let RN put oxygen back on. Pt informed his provider will be in to see him.  "

## 2022-08-16 NOTE — ED NOTES
Called [facility] and rehab for report on patient. Rehab nurse reports patient has been wearing oxygen for 3-4 days at 2 Liters, also that today patient became lethargic, sweaty, SOA, and chest pain. [facility] rehab reports giving a one time dose 40mg of lasix yesterday.
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
## 2022-08-16 NOTE — MBS/VFSS/FEES
Acute Care - Speech Language Pathology   Swallow Initial Evaluation  [place]     Patient Name: Chris Vizcarra  : 1955  MRN: [id number]  Today's Date: 2022               Admit Date: 2022    Visit Dx:     ICD-10-CM ICD-9-CM   1. Acute hypoxemic respiratory failure (HCC)  J96.01 518.81   2. Acute congestive heart failure, unspecified heart failure type (HCC)  I50.9 428.0   3. Acute UTI  N39.0 599.0     Patient Active Problem List   Diagnosis   • Acute hypoxemic respiratory failure (HCC)   • Gastroesophageal reflux disease without esophagitis   • Presence of suprapubic catheter (HCC)   • Tetraplegia (HCC)   • Vitamin D deficiency   • Anemia   • Hypernatremia   • Acute UTI (urinary tract infection)   • Acute CHF (congestive heart failure) (HCC)     Past Medical History:   Diagnosis Date   • Gastroesophageal reflux disease without esophagitis 2021   • Tetraplegia (HCC) 2021   • Vitamin D deficiency 2021     No past surgical history on file.    SLP Recommendation and Plan  SLP Swallowing Diagnosis: functional oral phase, mild, pharyngeal dysphagia (22)  SLP Diet Recommendation: ground, thin liquids (22)  Recommended Precautions and Strategies: upright posture during/after eating, small bites of food and sips of liquid, alternate between small bites of food and sips of liquid, general aspiration precautions, fatigue precautions, 1:1 supervision, assist with feeding (22)  SLP Rec. for Method of Medication Administration: meds whole, meds crushed, with pudding or applesauce (22)     Monitor for Signs of Aspiration: yes, notify SLP if any concerns (22)     Swallow Criteria for Skilled Therapeutic Interventions Met: demonstrates skilled criteria (22)     Rehab Potential/Prognosis, Swallowing: good, to achieve stated therapy goals (22)  Therapy Frequency (Swallow): PRN (22)  Predicted Duration Therapy  Intervention (Days): until discharge (08/16/22 1443)          SWALLOW EVALUATION (last 72 hours)     SLP Adult Swallow Evaluation     Row Name 08/16/22 1443       Rehab Evaluation    Document Type evaluation  -LF    Subjective Information no complaints  -LF    Patient Observations alert;cooperative;agree to therapy  -LF    Patient Effort good  -LF    Symptoms Noted During/After Treatment none  -LF            General Information    Patient Profile Reviewed yes  -LF    Current Method of Nutrition regular textures;thin liquids  -LF    Prior Level of Function-Swallowing no diet consistency restrictions  -LF    Plans/Goals Discussed with patient;agreed upon  -LF                    Oral Motor Structure and Function    Dentition Assessment upper dentures/partial in place;missing teeth  -LF    Secretion Management WNL/WFL  -LF    Mucosal Quality moist, healthy  -LF            Oral Musculature and Cranial Nerve Assessment    Oral Motor General Assessment WFL  -LF                    MBS/VFSS    Utensils Used spoon;straw  -LF    Consistencies Trialed mixed consistency;pureed;thin liquids;nectar/syrup-thick liquids  -LF            MBS/VFSS Interpretation    VFSS Summary Pt observed upright in the lateral projection and was given trials of NTL by spoon x1, by straw x2, thin liquids by spoon x1, by straw x2, puree x2, and mechanical ground (mixed) x2. Pt required feeding assistance from SLP coordinator w/ all trials.    NECTAR THICK LIQUIDS  Reduced bolus control w/ premature spillage to the vallecula w/ trial by spoon. Oral transit WFL. No penetration or aspiration observed at any time. No oropharyngeal residue noted.    THIN LIQUIDS  Pt takes large sips of thins by straw. Reduced bolus control w/ premature spillage to the pyriforms. Oral transit WFL. In/out penetration observed during the swallow x1 w/ thin liquids by straw. No other penetration/aspiration observed w/ any other trial. Mild pharyngeal residue.    PUREE  Reduced  bolus control w/ premature spillage to the vallecula. Trace in/out penetration observed during the swallow w/ initial trial. No pen/asp observed w/ second trial. Mild vallecular residue.    MECHANICAL SOFT (MIXED)  Reduced bolus control w/ premature spillage of peach juice to the pyriforms. In/out penetration during the swallow observed w/ second trial. No pen/asp observed w/ initial trial. Trace pharyngeal residue.     OVERALL  Adequate hyolaryngeal elevation and reduced epiglottic deflection.    Recommend pt initiate a mechanical ground and thin liquid diet. Pt should follow strict aspiration precautions and be a full feed. ST will continue to follow to ensure diet tolerance and make further recs as indicated.    -            Initiation of Pharyngeal Swallow    Pharyngeal Phase impaired pharyngeal phase of swallowing  -LF    Penetration During the Swallow thin liquids;mixed consistency;pudding/puree  -    Response to Penetration transient  -    Rosenbek's Scale thin:;pudding/puree:;mixed:;2--->level 2  -LF            SLP Evaluation Clinical Impression    SLP Swallowing Diagnosis functional oral phase;mild;pharyngeal dysphagia  -    Functional Impact risk of aspiration/pneumonia;risk of malnutrition  -    Rehab Potential/Prognosis, Swallowing good, to achieve stated therapy goals  -    Swallow Criteria for Skilled Therapeutic Interventions Met demonstrates skilled criteria  -            SLP Treatment Clinical Impressions    Care Plan Review evaluation/treatment results reviewed;care plan/treatment goals reviewed;risks/benefits reviewed;current/potential barriers reviewed;patient/other agree to care plan  -            Recommendations    Therapy Frequency (Swallow) PRN  -LF    Predicted Duration Therapy Intervention (Days) until discharge  -    SLP Diet Recommendation ground;thin liquids  -    Recommended Diagnostics --  -EC    Recommended Precautions and Strategies upright posture during/after  eating;small bites of food and sips of liquid;alternate between small bites of food and sips of liquid;general aspiration precautions;fatigue precautions;1:1 supervision;assist with feeding  -LF    Oral Care Recommendations Oral Care BID/PRN  -LF    SLP Rec. for Method of Medication Administration meds whole;meds crushed;with pudding or applesauce  -LF    Monitor for Signs of Aspiration yes;notify SLP if any concerns  -LF            Swallow Goals (SLP)    Swallow LTGs Swallow Long Term Goal (free text)  -LF    Swallow STGs diet tolerance goal selection (SLP)  -LF    Diet Tolerance Goal Selection (SLP) Swallow Short Term Goal 1;Swallow Short Term Goal 2  -LF            (LTG) Swallow    (LTG) Swallow Pt will maximixe swallow function for least restrictive PO diet, exhibiting no complication associated with dysphagia, adequate PO intake, and demonstrating independent use of swallow compensations  -LF    [place] (Swallow Long Term Goal) independently (over 90% accuracy)  -LF    Time Frame (Swallow Long Term Goal) by discharge  -LF    Comment (Swallow Long Term Goal) see above note  -LF            (STG) Swallow 1    (STG) Swallow 1 The patient will participate in ongoing assessment of swallow, including re-evaluation clinically and/or including instrumental assessment of swallow if indicated, to further assess swallow function in anticipation to initiate a po diet  -LF    [place] (Swallow Short Term Goal 1) with maximum cues (25-49% accuracy)  -LF    Time Frame (Swallow Short Term Goal 1) 1 week  -LF    Progress/Outcomes (Swallow Short Term Goal 1) goal met  -LF    Comment (Swallow Short Term Goal 1) see above note  -LF            (STG) Swallow 2    (STG) Swallow 2 The patient will participate in a full meal assessment to determine safety and adequacy of recommended diet, independent use of safe swallow compensations, and additional goals/recommendations to follow  -LF    Time Frame (Swallow Short Term Goal 2)  other (see comments)  2 days  -LF    Progress/Outcomes (Swallow Short Term Goal 2) new goal  -LF          User Key  (r) = Recorded By, (t) = Taken By, (c) = Cosigned By    Initials Name Effective Dates    EC Ariadne Crawford 06/16/21 -     LF Nat Rose SLP 06/16/21 -                 EDUCATION  The patient has been educated in the following areas:   Dysphagia (Swallowing Impairment) Oral Care/Hydration Modified Diet Instruction.       Patient was not wearing a face mask during this therapy encounter. Therapist used appropriate personal protective equipment including mask, eye protection and gloves.  Mask used was standard procedure mask. Appropriate PPE was worn during the entire therapy session. Hand hygiene was completed before and after therapy session. Patient is not in enhanced droplet precautions.                  Time Calculation:                NIKKI Villa  8/16/2022
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
## 2022-08-16 NOTE — DISCHARGE PLACEMENT REQUEST
"ZackeryGustaob (67 y.o. Male)             Date of Birth   1955    Social Security Number       Address   [facility] [address] [place] [address]    Home Phone       MRN   [id number]       Faith   None    Marital Status   Single                            Admission Date   8/16/22    Admission Type   Emergency    Admitting Provider   Louis Mckinnon MD    Attending Provider   Louis Mckinnon MD    Department, Room/Bed   [facility] EMERGENCY DEPARTMENT, EDH4/4       Discharge Date       Discharge Disposition       Discharge Destination                               Attending Provider: Louis Mckinnon MD    Allergies: No Known Allergies    Isolation: None   Infection: None   Code Status: CPR   Advance Care Planning Activity    Ht: 170.2 cm (67\")   Wt: 63.5 kg (140 lb)    Admission Cmt: None   Principal Problem: Acute hypoxemic respiratory failure (HCC) [J96.01]                 Active Insurance as of 8/16/2022     Primary Coverage     Payor Plan Insurance Group Employer/Plan Group    [facility] MEDICARE REPLACEMENT [facility] MEDICARE REPLACEMENT [id number]     Payor Plan Address Payor Plan Phone Number Payor Plan Fax Number Effective Dates    [address] [address]   5/1/2021 - None Entered    [facility] [id number]       Subscriber Name Subscriber Birth Date Member ID       GUSTABO ALARCON 1955 [id number]           Secondary Coverage     Payor Plan Insurance Group Employer/Plan Group    [place]      Payor Plan Address Payor Plan Phone Number Payor Plan Fax Number Effective Dates    [address]   7/7/2021 - None Entered    [place] [address]       Subscriber Name Subscriber Birth Date Member ID       GUSTABO ALARCON 1955 [id number]                 Emergency Contacts      (Rel.) Home Phone Work Phone Mobile Phone    Perlita Beltran (Sister) -- -- [phone]              "

## 2022-08-16 NOTE — CASE MANAGEMENT/SOCIAL WORK
Discharge Planning Assessment  [facility]     Patient Name: Chris Vizcarra  MRN: [id number]  Today's Date: 8/16/2022    Admit Date: 8/16/2022     Discharge Needs Assessment     Row Name 08/16/22 1347       Living Environment    People in Home facility resident    Name(s) of People in Home [facility]    Current Living Arrangements extended care facility    Primary Care Provided by self    Provides Primary Care For no one    Able to Return to Prior Arrangements yes       Resource/Environmental Concerns    Resource/Environmental Concerns none    Transportation Concerns none       Transition Planning    Patient/Family Anticipates Transition to long-term care facility    Patient/Family Anticipated Services at Transition skilled nursing    Transportation Anticipated health plan transportation       Discharge Needs Assessment    Equipment Currently Used at Home wheelchair    Concerns to be Addressed discharge planning    Anticipated Changes Related to Illness none    Equipment Needed After Discharge none    Discharge Facility/Level of Care Needs nursing facility, intermediate    Provided Post Acute Provider List? N/A    N/A Provider List Comment Lives at [facility] and sister wants him to return there               Discharge Plan     Row Name 08/16/22 1349       Plan    Plan Return to [facility], No precert or PASRR required    Patient/Family in Agreement with Plan yes    Plan Comments Pateint is confused, Called Sister. Erich lives at [facility] and sister wants him to return there at discharge. Per sister either Facility will need to provide transportation or EMS transport and patient is completely dependent and Contracted. Denies any further needs. Liaison for [facility] Tiffanie notified. d/c barriers: IV ATB, IV Lasix                  Expected Discharge Date and Time     Expected Discharge Date Expected Discharge Time    Aug 17, 2022          Demographic Summary     Row Name 08/16/22 1347       General  Information    Admission Type observation    Arrived From emergency department    Required Notices Provided Observation Status Notice    Referral Source admission list    Reason for Consult discharge planning    Preferred Language English               Functional Status     Row Name 08/16/22 1347       Functional Status    Usual Activity Tolerance poor    Current Activity Tolerance poor       Functional Status, IADL    Medications completely dependent    Meal Preparation completely dependent    Housekeeping completely dependent    Laundry completely dependent    Shopping completely dependent       Mental Status Summary    Recent Changes in Mental Status/Cognitive Functioning no changes                      Patient Forms     Row Name 08/16/22 1343       Patient Forms    Important Message from Medicare ([facility]) --  Serina 8/16    Patient Observation Letter Delivered    Delivered to Support person  Perlita Beltran    Method of delivery Telephone            Phone communication or documentation only - no physical contact with patient or family.          Tia Friedman RN

## 2022-08-17 ENCOUNTER — APPOINTMENT (OUTPATIENT)
Dept: CT IMAGING | Facility: HOSPITAL | Age: 67
End: 2022-08-17

## 2022-08-17 LAB
ANION GAP SERPL CALCULATED.3IONS-SCNC: 6 MMOL/L (ref 5–15)
BACTERIA BLD CULT: ABNORMAL
BASOPHILS # BLD AUTO: 0.1 10*3/MM3 (ref 0–0.2)
BASOPHILS NFR BLD AUTO: 1.1 % (ref 0–1.5)
BOTTLE TYPE: ABNORMAL
BUN SERPL-MCNC: 18 MG/DL (ref 8–23)
BUN/CREAT SERPL: 25.7 (ref 7–25)
CALCIUM SPEC-SCNC: 8.8 MG/DL (ref 8.6–10.5)
CHLORIDE SERPL-SCNC: 92 MMOL/L (ref 98–107)
CO2 SERPL-SCNC: 45 MMOL/L (ref 22–29)
CREAT SERPL-MCNC: 0.7 MG/DL (ref 0.76–1.27)
DEPRECATED RDW RBC AUTO: 43.8 FL (ref 37–54)
EGFRCR SERPLBLD CKD-EPI 2021: 101.6 ML/MIN/1.73
EOSINOPHIL # BLD AUTO: 0.2 10*3/MM3 (ref 0–0.4)
EOSINOPHIL NFR BLD AUTO: 2.6 % (ref 0.3–6.2)
ERYTHROCYTE [DISTWIDTH] IN BLOOD BY AUTOMATED COUNT: 15.4 % (ref 12.3–15.4)
GLUCOSE SERPL-MCNC: 79 MG/DL (ref 65–99)
HCT VFR BLD AUTO: 36.5 % (ref 37.5–51)
HGB BLD-MCNC: 10.9 G/DL (ref 13–17.7)
LYMPHOCYTES # BLD AUTO: 1.6 10*3/MM3 (ref 0.7–3.1)
LYMPHOCYTES NFR BLD AUTO: 24.4 % (ref 19.6–45.3)
MCH RBC QN AUTO: 23.6 PG (ref 26.6–33)
MCHC RBC AUTO-ENTMCNC: 29.9 G/DL (ref 31.5–35.7)
MCV RBC AUTO: 79.1 FL (ref 79–97)
MONOCYTES # BLD AUTO: 0.7 10*3/MM3 (ref 0.1–0.9)
MONOCYTES NFR BLD AUTO: 11.1 % (ref 5–12)
MRSA DNA SPEC QL NAA+PROBE: NORMAL
NEUTROPHILS NFR BLD AUTO: 4 10*3/MM3 (ref 1.7–7)
NEUTROPHILS NFR BLD AUTO: 60.8 % (ref 42.7–76)
NRBC BLD AUTO-RTO: 0 /100 WBC (ref 0–0.2)
PLATELET # BLD AUTO: 247 10*3/MM3 (ref 140–450)
PMV BLD AUTO: 8.4 FL (ref 6–12)
POTASSIUM SERPL-SCNC: 4 MMOL/L (ref 3.5–5.2)
RBC # BLD AUTO: 4.61 10*6/MM3 (ref 4.14–5.8)
SODIUM SERPL-SCNC: 143 MMOL/L (ref 136–145)
WBC NRBC COR # BLD: 6.6 10*3/MM3 (ref 3.4–10.8)

## 2022-08-17 PROCEDURE — 25010000002 CEFTRIAXONE PER 250 MG: Performed by: NURSE PRACTITIONER

## 2022-08-17 PROCEDURE — 74176 CT ABD & PELVIS W/O CONTRAST: CPT

## 2022-08-17 PROCEDURE — 85025 COMPLETE CBC W/AUTO DIFF WBC: CPT | Performed by: NURSE PRACTITIONER

## 2022-08-17 PROCEDURE — 87641 MR-STAPH DNA AMP PROBE: CPT | Performed by: NURSE PRACTITIONER

## 2022-08-17 PROCEDURE — 25010000002 ENOXAPARIN PER 10 MG: Performed by: NURSE PRACTITIONER

## 2022-08-17 PROCEDURE — 99232 SBSQ HOSP IP/OBS MODERATE 35: CPT | Performed by: INTERNAL MEDICINE

## 2022-08-17 PROCEDURE — 87102 FUNGUS ISOLATION CULTURE: CPT | Performed by: INTERNAL MEDICINE

## 2022-08-17 PROCEDURE — 80048 BASIC METABOLIC PNL TOTAL CA: CPT | Performed by: NURSE PRACTITIONER

## 2022-08-17 PROCEDURE — 87040 BLOOD CULTURE FOR BACTERIA: CPT | Performed by: NURSE PRACTITIONER

## 2022-08-17 PROCEDURE — 25010000002 FUROSEMIDE PER 20 MG: Performed by: NURSE PRACTITIONER

## 2022-08-17 RX ORDER — PANTOPRAZOLE SODIUM 40 MG/1
40 TABLET, DELAYED RELEASE ORAL
Status: DISCONTINUED | OUTPATIENT
Start: 2022-08-18 | End: 2022-08-19 | Stop reason: HOSPADM

## 2022-08-17 RX ORDER — ZINC OXIDE
1 OINTMENT (GRAM) TOPICAL AS NEEDED
COMMUNITY

## 2022-08-17 RX ORDER — PREDNISONE 10 MG/1
10 TABLET ORAL DAILY
COMMUNITY
Start: 2022-08-17 | End: 2022-08-20

## 2022-08-17 RX ORDER — PREDNISONE 1 MG/1
5 TABLET ORAL DAILY
COMMUNITY
Start: 2022-08-20 | End: 2022-08-23

## 2022-08-17 RX ADMIN — FUROSEMIDE 40 MG: 10 INJECTION, SOLUTION INTRAMUSCULAR; INTRAVENOUS at 05:37

## 2022-08-17 RX ADMIN — ENOXAPARIN SODIUM 40 MG: 100 INJECTION SUBCUTANEOUS at 15:11

## 2022-08-17 RX ADMIN — Medication 10 ML: at 21:10

## 2022-08-17 RX ADMIN — PANTOPRAZOLE SODIUM 40 MG: 40 INJECTION, POWDER, FOR SOLUTION INTRAVENOUS at 05:37

## 2022-08-17 RX ADMIN — Medication 10 ML: at 09:23

## 2022-08-17 RX ADMIN — CEFTRIAXONE 1 G: 1 INJECTION, POWDER, FOR SOLUTION INTRAMUSCULAR; INTRAVENOUS at 09:23
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
## 2022-08-17 NOTE — PLAN OF CARE
Goal Outcome Evaluation:  Plan of Care Reviewed With: patient        Progress: no change   Pt rested on and off through out the shift. Pt frequently seeks out staff. Pt expresses that he wants to leave the hospital. Pt has no medical complaints at this time. Vss. Will cont to monitor.

## 2022-08-17 NOTE — CASE MANAGEMENT/SOCIAL WORK
Continued Stay Note  [facility]     Patient Name: Chris Vizcarra  MRN: [id number]  Today's Date: 8/17/2022    Admit Date: 8/16/2022     Discharge Plan     Row Name 08/17/22 4130       Plan    Plan D/C plan: Return to [facility]. Precert to be started prior to d/c.    Patient/Family in Agreement with Plan yes    Plan Comments Anticipate return to [facility] at time of d/c. Confirmed with peter Moreno that precert will need to be started prior to d/c, will not hold up d/c, ok to return LTC under medicaid. Barrier to d/c: +Blood cultures, ID following, IV abx, IV lasix.                   Expected Discharge Date and Time     Expected Discharge Date Expected Discharge Time    Aug 17, 2022         Phone communication or documentation only - no physical contact with patient or family.      CHRIST CARTWRIGHT RN

## 2022-08-17 NOTE — CONSULTS
"Heart Failure Program  Nurse Navigator  Discharge Planning    Patient Name:Chris Vizcarra  :1955  Cardiologist:   Current Admission Date: 2022   Previous Admission:    Admission frequency: 1 admissions in 6 months    Heart Failure history per record:    Symptoms on admission:c/o swelling lower legs, chest pain per record. Pt from ECF, limited mobility hx tetraplegia.       Admission Weight:  Flowsheet Rows    Flowsheet Row First Filed Value   Admission Height 170.2 cm (67\") Documented at 2022 0141   Admission Weight 63.5 kg (140 lb) Documented at 2022 0141            Current Home Medications:  Prior to Admission medications    Medication Sig Start Date End Date Taking? Authorizing Provider   bisacodyl (DULCOLAX) 10 MG suppository Insert 10 mg into the rectum Daily As Needed for Constipation.   Yes Maritza Hodgson MD   ipratropium-albuterol (DUO-NEB) 0.5-2.5 mg/3 ml nebulizer Take 3 mL by nebulization 4 (Four) Times a Day.   Yes Maritza Hodgson MD   liver oil-zinc oxide (DESITIN) 40 % ointment Apply 1 application topically to the appropriate area as directed 2 (Two) Times a Day.   Yes Maritza Hodgson MD   liver oil-zinc oxide (DESITIN) 40 % ointment Apply 1 application topically to the appropriate area as directed As Needed for Irritation.   Yes Maritza Hodgson MD   omeprazole (priLOSEC) 20 MG capsule Take 20 mg by mouth 2 (Two) Times a Day.   Yes Maritza Hodgson MD   predniSONE (DELTASONE) 10 MG tablet Take 10 mg by mouth Daily. 22 Yes Maritza Hodgson MD   predniSONE (DELTASONE) 5 MG tablet Take 5 mg by mouth Daily. 22  Maritza Hodgson MD       Social history:   Pt from [facility], med adherence per record. Pt with limited discussion, advises he wants to leave.     Smoking status:     Diagnostics Testing:  proBNP level: 2525>2386    Echocardiogram:Results for orders placed during the hospital encounter of 22    Adult " Transthoracic Echo Complete W/ Cont if Necessary Per Protocol    Interpretation Summary  · Left ventricular ejection fraction appears to be 61 - 65%.  · Left ventricular diastolic function is consistent with (grade I) impaired relaxation.  · No significant valvular pathology.        Patient Assessment:   Pt lying in bed, resp even and unlabored, no SOA with conversation. Noted trace edema ankles. Pt reports this is improved he thinks. Denies pain    Current O2: 3L nC  Home O2: 3L nC    Education provided to patient:  yes- Heart Failure disease education  yes -Symptom identification/management  pending -Daily Weights  pending- Diet education  pending- Fluid restriction (if ordered)  na- Activity education  penidng- Medication education  na- Smoking cessation  pending- Follow-up Appointments  pending-Provided information on how to access [facility] My HF Guide/Heart Failure Interactive workbook    Acceptance of learning: pending family if posible    Heart Failure education interactive teaching session time: 20 minutes    GWTG: EF >40%    Identified needs/barriers:   Volume status, I&O, daily weights, follow-up    Intervention:

## 2022-08-17 NOTE — PLAN OF CARE
Problem: Fall Injury Risk  Goal: Absence of Fall and Fall-Related Injury  Outcome: Ongoing, Progressing  Intervention: Identify and Manage Contributors  Recent Flowsheet Documentation  Taken 8/17/2022 1210 by Darleen Summers LPN  Medication Review/Management: medications reviewed  Taken 8/17/2022 1010 by Darleen Summers LPN  Medication Review/Management: medications reviewed  Taken 8/17/2022 0810 by Darleen Summers LPN  Medication Review/Management: medications reviewed  Intervention: Promote Injury-Free Environment  Recent Flowsheet Documentation  Taken 8/17/2022 1210 by Darleen Summers LPN  Safety Promotion/Fall Prevention:   safety round/check completed   assistive device/personal items within reach   activity supervised  Taken 8/17/2022 1010 by Darleen Summers LPN  Safety Promotion/Fall Prevention:   safety round/check completed   activity supervised   assistive device/personal items within reach  Taken 8/17/2022 0810 by Darleen Summers LPN  Safety Promotion/Fall Prevention:   safety round/check completed   activity supervised   assistive device/personal items within reach     Problem: Pain Acute  Goal: Acceptable Pain Control and Functional Ability  Outcome: Ongoing, Progressing  Intervention: Prevent or Manage Pain  Recent Flowsheet Documentation  Taken 8/17/2022 1210 by Darleen Summers LPN  Medication Review/Management: medications reviewed  Taken 8/17/2022 1010 by Darleen Summers LPN  Medication Review/Management: medications reviewed  Taken 8/17/2022 0810 by Darleen Summers LPN  Medication Review/Management: medications reviewed  Intervention: Optimize Psychosocial Wellbeing  Recent Flowsheet Documentation  Taken 8/17/2022 0810 by Darleen Summers LPN  Diversional Activities: television     Problem: Chest Pain  Goal: Resolution of Chest Pain Symptoms  Outcome: Ongoing, Progressing     Problem: Adult Inpatient Plan of Care  Goal: Plan of Care Review  Outcome: Ongoing,  Progressing  Flowsheets (Taken 8/17/2022 1420)  Plan of Care Reviewed With: patient  Goal: Patient-Specific Goal (Individualized)  Outcome: Ongoing, Progressing  Goal: Absence of Hospital-Acquired Illness or Injury  Outcome: Ongoing, Progressing  Intervention: Identify and Manage Fall Risk  Recent Flowsheet Documentation  Taken 8/17/2022 1210 by Darleen Summers LPN  Safety Promotion/Fall Prevention:   safety round/check completed   assistive device/personal items within reach   activity supervised  Taken 8/17/2022 1010 by Darleen Summers LPN  Safety Promotion/Fall Prevention:   safety round/check completed   activity supervised   assistive device/personal items within reach  Taken 8/17/2022 0810 by Darleen Summers LPN  Safety Promotion/Fall Prevention:   safety round/check completed   activity supervised   assistive device/personal items within reach  Intervention: Prevent and Manage VTE (Venous Thromboembolism) Risk  Recent Flowsheet Documentation  Taken 8/17/2022 0810 by Darleen Summers LPN  Activity Management: activity adjusted per tolerance  Intervention: Prevent Infection  Recent Flowsheet Documentation  Taken 8/17/2022 1210 by Darleen Summers LPN  Infection Prevention: hand hygiene promoted  Taken 8/17/2022 1010 by Darleen Summers LPN  Infection Prevention: hand hygiene promoted  Taken 8/17/2022 0810 by Darleen Summers LPN  Infection Prevention: hand hygiene promoted  Goal: Optimal Comfort and Wellbeing  Outcome: Ongoing, Progressing  Intervention: Provide Person-Centered Care  Recent Flowsheet Documentation  Taken 8/17/2022 0810 by Darleen Summers LPN  Trust Relationship/Rapport: care explained  Goal: Readiness for Transition of Care  Outcome: Ongoing, Progressing     Problem: Skin Injury Risk Increased  Goal: Skin Health and Integrity  Outcome: Ongoing, Progressing   Goal Outcome Evaluation:  Plan of Care Reviewed With: patient         Patient had rested for most of shift. C/o being  claustrophobic and wanting to get up and walk. VSS. Patient is confused to place and situation. ID consulted for positive blood cx. Patient is a full feed with aspiration precautions. Subrapubic catheter is clean, dry, and intact. 3L NC. VSS. Patient will return to [facility] at d/c.
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
## 2022-08-18 LAB
ANION GAP SERPL CALCULATED.3IONS-SCNC: 6 MMOL/L (ref 5–15)
BACTERIA SPEC AEROBE CULT: NORMAL
BASOPHILS # BLD AUTO: 0 10*3/MM3 (ref 0–0.2)
BASOPHILS NFR BLD AUTO: 0.7 % (ref 0–1.5)
BUN SERPL-MCNC: 15 MG/DL (ref 8–23)
BUN/CREAT SERPL: 26.3 (ref 7–25)
CALCIUM SPEC-SCNC: 8.6 MG/DL (ref 8.6–10.5)
CHLORIDE SERPL-SCNC: 94 MMOL/L (ref 98–107)
CO2 SERPL-SCNC: 40 MMOL/L (ref 22–29)
CREAT SERPL-MCNC: 0.57 MG/DL (ref 0.76–1.27)
DEPRECATED RDW RBC AUTO: 44.6 FL (ref 37–54)
EGFRCR SERPLBLD CKD-EPI 2021: 108.1 ML/MIN/1.73
EOSINOPHIL # BLD AUTO: 0.2 10*3/MM3 (ref 0–0.4)
EOSINOPHIL NFR BLD AUTO: 3.8 % (ref 0.3–6.2)
ERYTHROCYTE [DISTWIDTH] IN BLOOD BY AUTOMATED COUNT: 15.8 % (ref 12.3–15.4)
GLUCOSE SERPL-MCNC: 81 MG/DL (ref 65–99)
HCT VFR BLD AUTO: 36.9 % (ref 37.5–51)
HGB BLD-MCNC: 11.3 G/DL (ref 13–17.7)
LYMPHOCYTES # BLD AUTO: 1.4 10*3/MM3 (ref 0.7–3.1)
LYMPHOCYTES NFR BLD AUTO: 22.7 % (ref 19.6–45.3)
MCH RBC QN AUTO: 23.8 PG (ref 26.6–33)
MCHC RBC AUTO-ENTMCNC: 30.5 G/DL (ref 31.5–35.7)
MCV RBC AUTO: 78.2 FL (ref 79–97)
MONOCYTES # BLD AUTO: 0.6 10*3/MM3 (ref 0.1–0.9)
MONOCYTES NFR BLD AUTO: 10.2 % (ref 5–12)
NEUTROPHILS NFR BLD AUTO: 3.8 10*3/MM3 (ref 1.7–7)
NEUTROPHILS NFR BLD AUTO: 62.6 % (ref 42.7–76)
NRBC BLD AUTO-RTO: 0.1 /100 WBC (ref 0–0.2)
PLATELET # BLD AUTO: 247 10*3/MM3 (ref 140–450)
PMV BLD AUTO: 8.3 FL (ref 6–12)
POTASSIUM SERPL-SCNC: 4.2 MMOL/L (ref 3.5–5.2)
RBC # BLD AUTO: 4.72 10*6/MM3 (ref 4.14–5.8)
SODIUM SERPL-SCNC: 140 MMOL/L (ref 136–145)
WBC NRBC COR # BLD: 6 10*3/MM3 (ref 3.4–10.8)

## 2022-08-18 PROCEDURE — 25010000002 ENOXAPARIN PER 10 MG: Performed by: NURSE PRACTITIONER

## 2022-08-18 PROCEDURE — 80048 BASIC METABOLIC PNL TOTAL CA: CPT | Performed by: NURSE PRACTITIONER

## 2022-08-18 PROCEDURE — 25010000002 CEFTRIAXONE PER 250 MG: Performed by: NURSE PRACTITIONER

## 2022-08-18 PROCEDURE — 99232 SBSQ HOSP IP/OBS MODERATE 35: CPT | Performed by: INTERNAL MEDICINE

## 2022-08-18 PROCEDURE — 85025 COMPLETE CBC W/AUTO DIFF WBC: CPT | Performed by: NURSE PRACTITIONER

## 2022-08-18 RX ORDER — MAGNESIUM CARB/ALUMINUM HYDROX 105-160MG
150 TABLET,CHEWABLE ORAL ONCE
Status: COMPLETED | OUTPATIENT
Start: 2022-08-18 | End: 2022-08-18

## 2022-08-18 RX ORDER — AMOXICILLIN 250 MG
1 CAPSULE ORAL 2 TIMES DAILY
Status: DISCONTINUED | OUTPATIENT
Start: 2022-08-18 | End: 2022-08-19 | Stop reason: HOSPADM

## 2022-08-18 RX ORDER — BISACODYL 10 MG
10 SUPPOSITORY, RECTAL RECTAL DAILY
Status: DISCONTINUED | OUTPATIENT
Start: 2022-08-18 | End: 2022-08-19 | Stop reason: HOSPADM

## 2022-08-18 RX ORDER — POLYETHYLENE GLYCOL 3350 17 G/17G
17 POWDER, FOR SOLUTION ORAL DAILY
Status: DISCONTINUED | OUTPATIENT
Start: 2022-08-18 | End: 2022-08-19 | Stop reason: HOSPADM

## 2022-08-18 RX ADMIN — CEFTRIAXONE 1 G: 1 INJECTION, POWDER, FOR SOLUTION INTRAMUSCULAR; INTRAVENOUS at 09:50

## 2022-08-18 RX ADMIN — BISACODYL 10 MG: 10 SUPPOSITORY RECTAL at 13:26

## 2022-08-18 RX ADMIN — SENNOSIDES AND DOCUSATE SODIUM 1 TABLET: 50; 8.6 TABLET ORAL at 20:45

## 2022-08-18 RX ADMIN — Medication 10 ML: at 09:49

## 2022-08-18 RX ADMIN — PANTOPRAZOLE SODIUM 40 MG: 40 TABLET, DELAYED RELEASE ORAL at 05:48

## 2022-08-18 RX ADMIN — Medication 150 ML: at 13:25

## 2022-08-18 RX ADMIN — POLYETHYLENE GLYCOL 3350 17 G: 17 POWDER, FOR SOLUTION ORAL at 13:25

## 2022-08-18 RX ADMIN — SENNOSIDES AND DOCUSATE SODIUM 1 TABLET: 50; 8.6 TABLET ORAL at 13:26

## 2022-08-18 RX ADMIN — Medication 10 ML: at 20:45

## 2022-08-18 RX ADMIN — ENOXAPARIN SODIUM 40 MG: 100 INJECTION SUBCUTANEOUS at 17:16

## 2022-08-18 NOTE — PLAN OF CARE
Goal Outcome Evaluation:           Progress: no change  Outcome Evaluation: Patient in bed with call light in reach. Patient able to make needs known. appears to be asleep at this time.

## 2022-08-18 NOTE — PROGRESS NOTES
Infectious Diseases Progress Note      LOS: 1 day   No care team member to display    Chief Complaint: Patient states that he is feeling well and has no current complaints    Subjective       The patient has been afebrile for the last 24 hours.  The patient is on 5 L of oxygen by nasal cannula, hemodynamically stable, and is tolerating antimicrobial therapy.      Review of Systems:   Review of Systems   Constitutional: Negative.    HENT: Negative.    Eyes: Negative.    Respiratory: Negative.    Cardiovascular: Negative.    Gastrointestinal: Negative.    Endocrine: Negative.    Genitourinary: Negative.    Musculoskeletal: Negative.    Skin: Negative.    Neurological: Negative.    Psychiatric/Behavioral: Negative.    All other systems reviewed and are negative.       Objective     Vital Signs  Temp:  [97.8 °F (36.6 °C)-98.1 °F (36.7 °C)] 97.8 °F (36.6 °C)  Heart Rate:  [82-87] 84  Resp:  [14-18] 18  BP: ()/(60-70) 96/60    Physical Exam:  Physical Exam  Vitals and nursing note reviewed.   Constitutional:       General: He is not in acute distress.     Appearance: He is well-developed and normal weight. He is not diaphoretic.      Comments:   Appears thin and chronically ill   HENT:      Head: Normocephalic and atraumatic.   Eyes:      Conjunctiva/sclera: Conjunctivae normal.      Pupils: Pupils are equal, round, and reactive to light.   Cardiovascular:      Rate and Rhythm: Normal rate and regular rhythm.      Heart sounds: Normal heart sounds, S1 normal and S2 normal.   Pulmonary:      Effort: Pulmonary effort is normal. No respiratory distress.      Breath sounds: No stridor. Rales present. No wheezing.   Abdominal:      General: Bowel sounds are normal. There is no distension.      Palpations: Abdomen is soft. There is no mass.      Tenderness: There is no abdominal tenderness. There is no guarding.   Genitourinary:     Comments: Suprapubic catheter  Musculoskeletal:         General: Deformity present.       Cervical back: Neck supple.   Skin:     General: Skin is warm and dry.      Coloration: Skin is not pale.      Findings: No erythema or rash.   Neurological:      Mental Status: He is alert and oriented to person, place, and time.      Cranial Nerves: No cranial nerve deficit.      Comments: Paraplegic   Psychiatric:         Mood and Affect: Mood normal.          Results Review:    I have reviewed all clinical data, test, lab, and imaging results.     Radiology  CT Abdomen Pelvis Stone Protocol    Result Date: 8/17/2022  CT ABDOMEN PELVIS STONE PROTOCOL-  Date of Exam: 8/17/2022 3:42 PM  Indication: Obstructive uropathy; J96.01-Acute respiratory failure with hypoxia; I50.9-Heart failure, unspecified; N39.0-Urinary tract infection, site not specified  constipation. Respiratory failure.  Comparison: None available.  Technique: Contiguous axial CT images were obtained from the lung bases to the pubic symphysis without contrast. Sagittal and coronal reconstructions were performed.  Automated exposure control and iterative reconstruction methods were used.  FINDINGS:  Lower Thorax: Emphysema. Airspace opacity in the left lower lobe atelectasis versus pneumonia. Scarring and volume loss in the inferior right middle lobe. Bronchial wall thickening. Traction bronchiectasis lower lobe. Elevated left diaphragm. Small hiatal hernia. Cardiomegaly. Trace left pleural effusion.  Peritoneum: No free air or free fluid.  Appendix: Appendix is well seen and is normal.  Kidneys, ureters, and urinary bladder: There is a large parapelvic cyst in the right kidney measuring 2.8 cm. There is a partially exophytic cyst at the superior pole of the left kidney measuring 1.8 cm. No hydronephrosis. No nephroureterolithiasis. Urinary bladder is small. Suprapubic catheter is present. Urinary bladder wall thickening is present.  Liver, gallbladder, and bile ducts: No focal hepatic lesions. Gallbladder and bile ducts are unremarkable.  Spleen:  Spleen is normal size.  No focal splenic lesions.  Adrenal glands: Unremarkable.  Pancreas: No focal masses.  No pancreatic duct dilation.  Abdominal aorta and vascular structures: No aneurysmal dilation. Moderate atherosclerotic disease.  Stomach and Bowel: No abnormally dilated loops of bowel.  No significant hiatal hernia.  No significant bowel wall thickening.  Ligament of Treitz has normal anatomic position. There is distention of the rectum. Rectum measures up to 9.8 cm and is full of formed stool. Fecal impaction is most likely. There is a moderate amount of stool seen in the sigmoid colon and descending colon as well.  Reproductive Organs: Within normal limits.  Lymph nodes: No pathologically enlarged lymph nodes.  Soft tissues: Unremarkable.  Osseous structures: Osteopenia. Osteonecrosis of the femoral heads bilaterally right slightly worse than left.  Evaluation of bowel and solid organs is limited without contrast administration.      Large amount of stool in the rectum with rectal distention consistent with fecal impaction. Bilateral renal cysts. Volume loss in the left lower lobe with airspace opacity atelectasis versus pneumonia. Bronchiectasis bronchial wall thickening and volume loss and emphysema in the lung bases. Trace sized left pleural effusion. Small hiatal hernia. Cardiomegaly. Limited evaluation without contrast. 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    Electronically Signed By-Chitra Molina MD On:8/17/2022 4:01 PM This report was finalized on [id number] by  Chitra Molina MD.      Cardiology    Laboratory    Results from last 7 days   Lab Units 08/17/22  2342 08/17/22  0133 08/16/22  0518 08/16/22  0331   WBC 10*3/mm3 6.00 6.60 7.60  --    HEMOGLOBIN g/dL 11.3* 10.9* 10.9*  --    HEMOGLOBIN, POC g/dL  --   --   --  12.9   HEMATOCRIT % 36.9* 36.5* 35.7*  --    HEMATOCRIT POC %  --   --   --  38   PLATELETS 10*3/mm3 247 247 232  --      Results  from last 7 days   Lab Units 08/17/22  2341 08/17/22  0133 08/16/22  0325   SODIUM mmol/L 140 143 147*   POTASSIUM mmol/L 4.2 4.0 4.3   CHLORIDE mmol/L 94* 92* 99   CO2 mmol/L 40.0* 45.0* 41.0*   BUN mg/dL 15 18 18   CREATININE mg/dL 0.57* 0.70* 0.70*   GLUCOSE mg/dL 81 79 80   ALBUMIN g/dL  --   --  3.40*   BILIRUBIN mg/dL  --   --  0.3   ALK PHOS U/L  --   --  124*   AST (SGOT) U/L  --   --  17   ALT (SGPT) U/L  --   --  17   CALCIUM mg/dL 8.6 8.8 9.2                 Microbiology   Microbiology Results (last 10 days)     Procedure Component Value - Date/Time    MRSA Screen, PCR (Inpatient) - Swab, Nares [[id number]]  (Normal) Collected: 08/17/22 1511    Lab Status: Final result Specimen: Swab from Nares Updated: 08/17/22 1704     MRSA PCR No MRSA Detected    Narrative:      The negative predictive value of this diagnostic test is high and should only be used to consider de-escalating anti-MRSA therapy. A positive result may indicate colonization with MRSA and must be correlated clinically.    LUZ AURIS SCREEN - Swab, Axilla Right, Axilla Left and Groin [[id number]]  (Normal) Collected: 08/17/22 0128    Lab Status: Preliminary result Specimen: Swab from Axilla Right, Axilla Left and Groin Updated: 08/18/22 0347     Candida Auris Screen Culture No Candida auris isolated at 24 hours    Blood Culture - Blood, Hand, Left [[id number]]  (Abnormal) Collected: 08/16/22 0522    Lab Status: Preliminary result Specimen: Blood from Hand, Left Updated: 08/18/22 0658     Blood Culture Staphylococcus, coagulase negative     Isolated from Aerobic Bottle     Gram Stain Aerobic Bottle Gram positive cocci in pairs and clusters    Blood Culture - Blood, Hand, Right [[id number]]  (Abnormal) Collected: 08/16/22 0522    Lab Status: Preliminary result Specimen: Blood from Hand, Right Updated: 08/18/22 0657     Blood Culture Staphylococcus, coagulase negative     Isolated from Aerobic and Anaerobic Bottles     Gram Stain Aerobic Bottle  Gram positive cocci in pairs and clusters      Anaerobic Bottle Gram positive cocci in pairs and clusters    Blood Culture ID, PCR - Blood, Hand, Right [[id number]]  (Abnormal) Collected: 08/16/22 0522    Lab Status: Final result Specimen: Blood from Hand, Right Updated: 08/17/22 0417     BCID, PCR Staph spp, not aureus or lugdunesis. Identification by BCID2 PCR.     BOTTLE TYPE Aerobic Bottle    Urine Culture - Urine, Urine, Catheter [[id number]]  (Normal) Collected: 08/16/22 0337    Lab Status: Preliminary result Specimen: Urine, Catheter Updated: 08/17/22 1411     Urine Culture Culture in progress    Legionella Antigen, Urine - Urine, Urine, Clean Catch [[id number]]  (Normal) Collected: 08/16/22 0337    Lab Status: Final result Specimen: Urine, Clean Catch Updated: 08/16/22 0651     LEGIONELLA ANTIGEN, URINE Negative    S. Pneumo Ag Urine or CSF - Urine, Urine, Clean Catch [[id number]]  (Normal) Collected: 08/16/22 0337    Lab Status: Final result Specimen: Urine, Clean Catch Updated: 08/16/22 0651     Strep Pneumo Ag Negative    Respiratory Panel PCR w/COVID-19(SARS-CoV-2) SUNIL/SRINIVAS/DAVE/PAD/COR/MAD/ANDRZEJ In-House, NP Swab in UTM/VTM, 3-4 HR TAT - Swab, Nasopharynx [[id number]]  (Normal) Collected: 08/16/22 0330    Lab Status: Final result Specimen: Swab from Nasopharynx Updated: 08/16/22 0441     ADENOVIRUS, PCR Not Detected     Coronavirus 229E Not Detected     Coronavirus HKU1 Not Detected     Coronavirus NL63 Not Detected     Coronavirus OC43 Not Detected     COVID19 Not Detected     Human Metapneumovirus Not Detected     Human Rhinovirus/Enterovirus Not Detected     Influenza A PCR Not Detected     Influenza B PCR Not Detected     Parainfluenza Virus 1 Not Detected     Parainfluenza Virus 2 Not Detected     Parainfluenza Virus 3 Not Detected     Parainfluenza Virus 4 Not Detected     RSV, PCR Not Detected     Bordetella pertussis pcr Not Detected     Bordetella parapertussis PCR Not Detected     Chlamydophila  pneumoniae PCR Not Detected     Mycoplasma pneumo by PCR Not Detected    Narrative:      In the setting of a positive respiratory panel with a viral infection PLUS a negative procalcitonin without other underlying concern for bacterial infection, consider observing off antibiotics or discontinuation of antibiotics and continue supportive care. If the respiratory panel is positive for atypical bacterial infection (Bordetella pertussis, Chlamydophila pneumoniae, or Mycoplasma pneumoniae), consider antibiotic de-escalation to target atypical bacterial infection.          Medication Review:       Schedule Meds  bisacodyl, 10 mg, Rectal, Daily  cefTRIAXone, 1 g, Intravenous, Q24H  enoxaparin, 40 mg, Subcutaneous, Daily  magnesium citrate, 150 mL, Oral, Once  pantoprazole, 40 mg, Oral, Q AM  polyethylene glycol, 17 g, Oral, Daily  senna-docusate sodium, 1 tablet, Oral, BID  sodium chloride, 10 mL, Intravenous, Q12H        Infusion Meds       PRN Meds  •  acetaminophen **OR** acetaminophen **OR** acetaminophen  •  aluminum-magnesium hydroxide-simethicone  •  bisacodyl  •  ipratropium-albuterol  •  melatonin  •  nitroglycerin  •  ondansetron **OR** ondansetron  •  sodium chloride  •  sodium chloride        Assessment & Plan       Antimicrobial Therapy   1.  IV Rocephin        2.        3.        4.        5.            Assessment     Possible urinary tract infection with urinalysis showing trace bacteria and pyuria.  However the cultures from a suprapubic catheter which can often be colonization.  Patient denies any flank pain or fever or chills.  -Cultures in progress  -CT did not show any obstructive uropathy     Patient apparently was admitted for chest pain but denies ever having chest pain or having any current chest pain     Positive blood cultures in 2 out of 2 samples for Staphylococcus not aureus.  However these appear to be taken at the same time.     Concern for left lower lobe pneumonia.  Patient is currently on  5 L of oxygen by nasal cannula.  Appears to be on some oxygen at his facility but does not know how many liters  -Respiratory viral DNA panel, COVID-19 screen, Legionella pneumococcal urine antigen are all negative  -CT showed some left lower lobe pneumonia  -MRSA the nares screen is negative  -Patient states that he is on 2 L of oxygen at baseline when he does wear oxygen     History of paraplegia with suprapubic catheter placement from the 1970s.     Tobacco abuse     Plan     Continue IV Rocephin for now  Waiting on urine cultures  Repeat blood cultures x2-pending  Continue supportive care  A.mRadha labs    Indigo Oseguera, APRN  08/18/22  12:42 EDT    Note is dictated utilizing voice recognition software/Dragon

## 2022-08-18 NOTE — CASE MANAGEMENT/SOCIAL WORK
Continued Stay Note  ANTON Wilder     Patient Name: Chris Vizcarra  MRN: [id number]  Today's Date: 8/18/2022    Admit Date: 8/16/2022     Discharge Plan     Row Name 08/18/22 1355       Plan    Plan D/C plan: Return to [facility]. Precert to be started prior to d/c.    Patient/Family in Agreement with Plan yes    Plan Comments Plan to return to [facility] at time of discharge. Will need precert started prior to discharge. Able to return under medicaid. Barrier to d/c: ID following, IV abx, fecal impaction                   Expected Discharge Date and Time     Expected Discharge Date Expected Discharge Time    Aug 19, 2022       Phone communication or documentation only - no physical contact with patient or family.        CHRIST CARTWRIGHT RN

## 2022-08-18 NOTE — PLAN OF CARE
Goal Outcome Evaluation:   Patient has rested well throughout the day with no c/o pain. Pt was given multiple medications to produce a BM. Could feel how impacted pt was while giving suppository. Pt had a BM approx 2 hours after but was not that much. Pt has been choking on food and coughing a lot while eating and drinking. I am going to put in an order for SLP. VSS, continue to monitor.
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
## 2022-08-19 ENCOUNTER — READMISSION MANAGEMENT (OUTPATIENT)
Dept: CALL CENTER | Facility: HOSPITAL | Age: 67
End: 2022-08-19

## 2022-08-19 VITALS
DIASTOLIC BLOOD PRESSURE: 67 MMHG | WEIGHT: 194.9 LBS | SYSTOLIC BLOOD PRESSURE: 101 MMHG | HEIGHT: 67 IN | BODY MASS INDEX: 30.59 KG/M2 | OXYGEN SATURATION: 94 % | HEART RATE: 80 BPM | TEMPERATURE: 97.5 F | RESPIRATION RATE: 18 BRPM

## 2022-08-19 LAB
ANION GAP SERPL CALCULATED.3IONS-SCNC: 5 MMOL/L (ref 5–15)
BACTERIA SPEC AEROBE CULT: ABNORMAL
BACTERIA SPEC AEROBE CULT: ABNORMAL
BASOPHILS # BLD AUTO: 0 10*3/MM3 (ref 0–0.2)
BASOPHILS NFR BLD AUTO: 0.7 % (ref 0–1.5)
BUN SERPL-MCNC: 17 MG/DL (ref 8–23)
BUN/CREAT SERPL: 25.4 (ref 7–25)
CALCIUM SPEC-SCNC: 8.6 MG/DL (ref 8.6–10.5)
CHLORIDE SERPL-SCNC: 96 MMOL/L (ref 98–107)
CO2 SERPL-SCNC: 38 MMOL/L (ref 22–29)
CREAT SERPL-MCNC: 0.67 MG/DL (ref 0.76–1.27)
DEPRECATED RDW RBC AUTO: 43.3 FL (ref 37–54)
EGFRCR SERPLBLD CKD-EPI 2021: 103 ML/MIN/1.73
EOSINOPHIL # BLD AUTO: 0.4 10*3/MM3 (ref 0–0.4)
EOSINOPHIL NFR BLD AUTO: 5.8 % (ref 0.3–6.2)
ERYTHROCYTE [DISTWIDTH] IN BLOOD BY AUTOMATED COUNT: 15.4 % (ref 12.3–15.4)
GLUCOSE SERPL-MCNC: 101 MG/DL (ref 65–99)
GRAM STN SPEC: ABNORMAL
HCT VFR BLD AUTO: 37.1 % (ref 37.5–51)
HGB BLD-MCNC: 11.2 G/DL (ref 13–17.7)
ISOLATED FROM: ABNORMAL
ISOLATED FROM: ABNORMAL
LYMPHOCYTES # BLD AUTO: 1.3 10*3/MM3 (ref 0.7–3.1)
LYMPHOCYTES NFR BLD AUTO: 20.1 % (ref 19.6–45.3)
MCH RBC QN AUTO: 23.5 PG (ref 26.6–33)
MCHC RBC AUTO-ENTMCNC: 30.2 G/DL (ref 31.5–35.7)
MCV RBC AUTO: 77.9 FL (ref 79–97)
MONOCYTES # BLD AUTO: 0.7 10*3/MM3 (ref 0.1–0.9)
MONOCYTES NFR BLD AUTO: 10.2 % (ref 5–12)
NEUTROPHILS NFR BLD AUTO: 4 10*3/MM3 (ref 1.7–7)
NEUTROPHILS NFR BLD AUTO: 63.2 % (ref 42.7–76)
NRBC BLD AUTO-RTO: 0 /100 WBC (ref 0–0.2)
PLATELET # BLD AUTO: 223 10*3/MM3 (ref 140–450)
PMV BLD AUTO: 8.5 FL (ref 6–12)
POTASSIUM SERPL-SCNC: 4.8 MMOL/L (ref 3.5–5.2)
RBC # BLD AUTO: 4.76 10*6/MM3 (ref 4.14–5.8)
SODIUM SERPL-SCNC: 139 MMOL/L (ref 136–145)
WBC NRBC COR # BLD: 6.4 10*3/MM3 (ref 3.4–10.8)

## 2022-08-19 PROCEDURE — 92526 ORAL FUNCTION THERAPY: CPT

## 2022-08-19 PROCEDURE — 99239 HOSP IP/OBS DSCHRG MGMT >30: CPT | Performed by: INTERNAL MEDICINE

## 2022-08-19 PROCEDURE — 25010000002 CEFTRIAXONE PER 250 MG: Performed by: NURSE PRACTITIONER

## 2022-08-19 RX ORDER — CEFDINIR 300 MG/1
300 CAPSULE ORAL EVERY 12 HOURS SCHEDULED
Qty: 14 CAPSULE | Refills: 0 | Status: SHIPPED | OUTPATIENT
Start: 2022-08-19 | End: 2022-08-26

## 2022-08-19 RX ORDER — POLYETHYLENE GLYCOL 3350 17 G/17G
17 POWDER, FOR SOLUTION ORAL DAILY
Qty: 30 EACH | Refills: 0 | Status: SHIPPED | OUTPATIENT
Start: 2022-08-20

## 2022-08-19 RX ORDER — AMOXICILLIN 250 MG
1 CAPSULE ORAL 2 TIMES DAILY
Qty: 30 TABLET | Refills: 0 | Status: SHIPPED | OUTPATIENT
Start: 2022-08-19

## 2022-08-19 RX ORDER — CEFDINIR 300 MG/1
300 CAPSULE ORAL EVERY 12 HOURS SCHEDULED
Status: DISCONTINUED | OUTPATIENT
Start: 2022-08-19 | End: 2022-08-19 | Stop reason: HOSPADM

## 2022-08-19 RX ADMIN — PANTOPRAZOLE SODIUM 40 MG: 40 TABLET, DELAYED RELEASE ORAL at 05:13

## 2022-08-19 RX ADMIN — POLYETHYLENE GLYCOL 3350 17 G: 17 POWDER, FOR SOLUTION ORAL at 09:27

## 2022-08-19 RX ADMIN — Medication 10 ML: at 09:28

## 2022-08-19 RX ADMIN — CEFTRIAXONE 1 G: 1 INJECTION, POWDER, FOR SOLUTION INTRAMUSCULAR; INTRAVENOUS at 09:28

## 2022-08-19 RX ADMIN — BISACODYL 10 MG: 10 SUPPOSITORY RECTAL at 09:28

## 2022-08-19 RX ADMIN — SENNOSIDES AND DOCUSATE SODIUM 1 TABLET: 50; 8.6 TABLET ORAL at 09:28

## 2022-08-19 NOTE — PLAN OF CARE
Goal Outcome Evaluation:         Patient discharging to [facility] today on oral antibiotics.

## 2022-08-19 NOTE — PLAN OF CARE
Goal Outcome Evaluation:  Pt seen for skilled ST targeting dysphagia this date. Pt made NPO on 8/18 PM d/t difficulty w/ PO. Pt given trials of thin liquids by straw, puree, and mechancial ground (mixed). Pt positioned upright at 90 degrees and all trials fed by SLP. Oral phase characterized by adequate mastication w/ soft solids. Adequate labial seal w/ no anterior loss. Oral transit WFL. Cough x1 following trial of peaches. No other overt s/s of aspiration observed at any time. Pt's vocal quality remained clear w/ all PO. No oral pocketing/residue noted. Recommend pt initiate a mechanical ground and thin liquid diet w/ no mixed consistencies. ST will continue to follow.

## 2022-08-19 NOTE — CASE MANAGEMENT/SOCIAL WORK
Continued Stay Note  ANTON Wilder     Patient Name: Chris Vizcarra  MRN: [id number]  Today's Date: 8/19/2022    Admit Date: 8/16/2022     Discharge Plan     Row Name 08/19/22 1602       Plan    Plan D/C plan: Return to [facility]. No precert required.    Patient/Family in Agreement with Plan yes    Plan Comments ID has signed off, CM discussed with Dr. Mckinnon, plan to d/c today. CM spoke with Tiffanie [facility] liaison, pt ok to return anytime LTC to [place]. Per Tiffanie, no precert required. Pt to transfer via EMS.                   Expected Discharge Date and Time     Expected Discharge Date Expected Discharge Time    Aug 19, 2022       Phone communication or documentation only - no physical contact with patient or family.      CHRIST CARTWRIGHT RN

## 2022-08-19 NOTE — PLAN OF CARE
Goal Outcome Evaluation:  Plan of Care Reviewed With: patient        Progress: no change   Pt up most of the night. Frequently seeks staff for non medical assistance. Pt has been npo pending a speech eval due to signs of choking during meal feed. Pt vss. Will cont to monitor.

## 2022-08-19 NOTE — THERAPY RE-EVALUATION
Acute Care - Speech Language Pathology   Swallow Re-Evaluation  [place]     Patient Name: Chris Vizcarra  : 1955  MRN: [id number]  Today's Date: 2022               Admit Date: 2022    Visit Dx:     ICD-10-CM ICD-9-CM   1. Acute hypoxemic respiratory failure (HCC)  J96.01 518.81   2. Acute congestive heart failure, unspecified heart failure type (HCC)  I50.9 428.0   3. Acute UTI  N39.0 599.0     Patient Active Problem List   Diagnosis   • Acute hypoxemic respiratory failure (HCC)   • Gastroesophageal reflux disease without esophagitis   • Presence of suprapubic catheter (HCC)   • Tetraplegia (HCC)   • Vitamin D deficiency   • Anemia   • Hypernatremia   • Acute UTI (urinary tract infection)   • Acute CHF (congestive heart failure) (HCC)     Past Medical History:   Diagnosis Date   • Gastroesophageal reflux disease without esophagitis 2021   • Tetraplegia (HCC) 2021   • Vitamin D deficiency 2021     No past surgical history on file.    SLP Recommendation and Plan  SLP Swallowing Diagnosis: functional oral phase, mild, pharyngeal dysphagia (22 1500)  SLP Diet Recommendation: mechanical soft with no mixed consistencies, thin liquids (22 1500)  Recommended Precautions and Strategies: upright posture during/after eating, small bites of food and sips of liquid, alternate between small bites of food and sips of liquid, general aspiration precautions, fatigue precautions, 1:1 supervision, assist with feeding (22 1500)  SLP Rec. for Method of Medication Administration: meds whole, meds crushed, with pudding or applesauce (22 1500)     Monitor for Signs of Aspiration: yes, notify SLP if any concerns (22 1500)     Swallow Criteria for Skilled Therapeutic Interventions Met: demonstrates skilled criteria (22 1500)     Rehab Potential/Prognosis, Swallowing: good, to achieve stated therapy goals (22 1500)  Therapy Frequency (Swallow): PRN (22  1500)  Predicted Duration Therapy Intervention (Days): until discharge (08/19/22 1500)            SWALLOW EVALUATION (last 72 hours)     SLP Adult Swallow Evaluation     Row Name 08/19/22 1500       Rehab Evaluation    Document Type re-evaluation  -    Subjective Information no complaints  -LF    Patient Observations alert;cooperative;agree to therapy  -LF    Patient Effort good  -LF    Comment Pt seen for skilled ST targeting dysphagia this date. Pt made NPO on 8/18 PM d/t difficulty w/ PO. Pt given trials of thin liquids by straw, puree, and mechancial ground (mixed). Pt positioned upright at 90 degrees and all trials fed by SLP. Oral phase characterized by adequate mastication w/ soft solids. Adequate labial seal w/ no anterior loss. Oral transit WFL. Cough x1 following trial of peaches. No other overt s/s of aspiration observed at any time. Pt's vocal quality remained clear w/ all PO. No oral pocketing/residue noted. Recommend pt initiate a mechanical ground and thin liquid diet w/ no mixed consistencies. ST will continue to follow.  -LF    Symptoms Noted During/After Treatment none  -LF            General Information    Patient Profile Reviewed yes  -LF            SLP Evaluation Clinical Impression    SLP Swallowing Diagnosis functional oral phase;mild;pharyngeal dysphagia  -LF    Functional Impact risk of aspiration/pneumonia;risk of malnutrition  -LF    Rehab Potential/Prognosis, Swallowing good, to achieve stated therapy goals  -    Swallow Criteria for Skilled Therapeutic Interventions Met demonstrates skilled criteria  -            SLP Treatment Clinical Impressions    Care Plan Review evaluation/treatment results reviewed  -            Recommendations    Therapy Frequency (Swallow) PRN  -LF    Predicted Duration Therapy Intervention (Days) until discharge  -    SLP Diet Recommendation mechanical soft with no mixed consistencies;thin liquids  -    Recommended Precautions and Strategies upright  posture during/after eating;small bites of food and sips of liquid;alternate between small bites of food and sips of liquid;general aspiration precautions;fatigue precautions;1:1 supervision;assist with feeding  -LF    Oral Care Recommendations Oral Care BID/PRN  -LF    SLP Rec. for Method of Medication Administration meds whole;meds crushed;with pudding or applesauce  -LF    Monitor for Signs of Aspiration yes;notify SLP if any concerns  -LF            Swallow Goals (SLP)    Swallow LTGs Swallow Long Term Goal (free text)  -LF    Swallow STGs diet tolerance goal selection (SLP)  -LF    Diet Tolerance Goal Selection (SLP) Swallow Short Term Goal 1;Swallow Short Term Goal 2  -LF            (LTG) Swallow    (LTG) Swallow Pt will maximixe swallow function for least restrictive PO diet, exhibiting no complication associated with dysphagia, adequate PO intake, and demonstrating independent use of swallow compensations  -LF    [place] (Swallow Long Term Goal) independently (over 90% accuracy)  -LF    Time Frame (Swallow Long Term Goal) by discharge  -LF    Progress/Outcomes (Swallow Long Term Goal) goal ongoing  -LF    Comment (Swallow Long Term Goal) see above note  -LF            (STG) Swallow 1    (STG) Swallow 1 The patient will participate in ongoing assessment of swallow, including re-evaluation clinically and/or including instrumental assessment of swallow if indicated, to further assess swallow function in anticipation to initiate a po diet  -LF    [place] (Swallow Short Term Goal 1) with maximum cues (25-49% accuracy)  -LF    Time Frame (Swallow Short Term Goal 1) 1 week  -LF    Progress/Outcomes (Swallow Short Term Goal 1) goal met  -LF    Comment (Swallow Short Term Goal 1) see above note  -LF            (STG) Swallow 2    (STG) Swallow 2 The patient will participate in a full meal assessment to determine safety and adequacy of recommended diet, independent use of safe swallow compensations, and  additional goals/recommendations to follow  -LF    Time Frame (Swallow Short Term Goal 2) 1 week  -LF    Progress/Outcomes (Swallow Short Term Goal 2) goal ongoing  -LF    Comment (Swallow Short Term Goal 2) see above note  -LF          User Key  (r) = Recorded By, (t) = Taken By, (c) = Cosigned By    Initials Name Effective Dates    Nat Collins SLP 06/16/21 -                 EDUCATION  The patient has been educated in the following areas:   Dysphagia (Swallowing Impairment) Oral Care/Hydration Modified Diet Instruction.       Patient was not wearing a face mask during this therapy encounter. Therapist used appropriate personal protective equipment including gown, eye protection, mask and gloves.  Mask used was standard procedure mask. Appropriate PPE was worn during the entire therapy session. Hand hygiene was completed before and after therapy session. Patient is not in enhanced droplet precautions.                  Time Calculation:                NIKKI Villa  8/19/2022
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
## 2022-08-19 NOTE — PROGRESS NOTES
Infectious Diseases Progress Note      LOS: 2 days   No care team member to display    Chief Complaint: Patient states that he is feeling well and has no current complaints    Subjective       The patient has been afebrile for the last 24 hours.  The patient is on 4 L of oxygen by nasal cannula, hemodynamically stable, and is tolerating antimicrobial therapy.  He has no new complaints      Review of Systems:   Review of Systems   Constitutional: Negative.    HENT: Negative.    Eyes: Negative.    Respiratory: Negative.    Cardiovascular: Negative.    Gastrointestinal: Negative.    Endocrine: Negative.    Genitourinary: Negative.    Musculoskeletal: Negative.    Skin: Negative.    Neurological: Negative.    Psychiatric/Behavioral: Negative.    All other systems reviewed and are negative.       Objective     Vital Signs  Temp:  [97.5 °F (36.4 °C)-98 °F (36.7 °C)] 97.5 °F (36.4 °C)  Heart Rate:  [76-85] 80  Resp:  [16-18] 18  BP: ()/(57-74) 101/67    Physical Exam:  Physical Exam  Vitals and nursing note reviewed.   Constitutional:       General: He is not in acute distress.     Appearance: He is well-developed and normal weight. He is not diaphoretic.      Comments:   Appears thin and chronically ill   HENT:      Head: Normocephalic and atraumatic.   Eyes:      Conjunctiva/sclera: Conjunctivae normal.      Pupils: Pupils are equal, round, and reactive to light.   Cardiovascular:      Rate and Rhythm: Normal rate and regular rhythm.      Heart sounds: Normal heart sounds, S1 normal and S2 normal.   Pulmonary:      Effort: Pulmonary effort is normal. No respiratory distress.      Breath sounds: No stridor. Rales present. No wheezing.   Abdominal:      General: Bowel sounds are normal. There is no distension.      Palpations: Abdomen is soft. There is no mass.      Tenderness: There is no abdominal tenderness. There is no guarding.   Genitourinary:     Comments: Suprapubic catheter  Musculoskeletal:         General:  Deformity present.      Cervical back: Neck supple.   Skin:     General: Skin is warm and dry.      Coloration: Skin is not pale.      Findings: No erythema or rash.   Neurological:      Mental Status: He is alert and oriented to person, place, and time.      Cranial Nerves: No cranial nerve deficit.      Comments: Paraplegic   Psychiatric:         Mood and Affect: Mood normal.          Results Review:    I have reviewed all clinical data, test, lab, and imaging results.     Radiology  No Radiology Exams Resulted Within Past 24 Hours    Cardiology    Laboratory    Results from last 7 days   Lab Units 08/18/22  2340 08/17/22  2342 08/17/22  0133 08/16/22  0518 08/16/22  0331   WBC 10*3/mm3 6.40 6.00 6.60 7.60  --    HEMOGLOBIN g/dL 11.2* 11.3* 10.9* 10.9*  --    HEMOGLOBIN, POC g/dL  --   --   --   --  12.9   HEMATOCRIT % 37.1* 36.9* 36.5* 35.7*  --    HEMATOCRIT POC %  --   --   --   --  38   PLATELETS 10*3/mm3 223 247 247 232  --      Results from last 7 days   Lab Units 08/18/22  2340 08/17/22  2341 08/17/22  0133 08/16/22  0325   SODIUM mmol/L 139 140 143 147*   POTASSIUM mmol/L 4.8 4.2 4.0 4.3   CHLORIDE mmol/L 96* 94* 92* 99   CO2 mmol/L 38.0* 40.0* 45.0* 41.0*   BUN mg/dL 17 15 18 18   CREATININE mg/dL 0.67* 0.57* 0.70* 0.70*   GLUCOSE mg/dL 101* 81 79 80   ALBUMIN g/dL  --   --   --  3.40*   BILIRUBIN mg/dL  --   --   --  0.3   ALK PHOS U/L  --   --   --  124*   AST (SGOT) U/L  --   --   --  17   ALT (SGPT) U/L  --   --   --  17   CALCIUM mg/dL 8.6 8.6 8.8 9.2                 Microbiology   Microbiology Results (last 10 days)     Procedure Component Value - Date/Time    MRSA Screen, PCR (Inpatient) - Swab, Nares [[id number]]  (Normal) Collected: 08/17/22 1511    Lab Status: Final result Specimen: Swab from Nares Updated: 08/17/22 1704     MRSA PCR No MRSA Detected    Narrative:      The negative predictive value of this diagnostic test is high and should only be used to consider de-escalating anti-MRSA  therapy. A positive result may indicate colonization with MRSA and must be correlated clinically.    Blood Culture - Blood, Hand, Right [[id number]]  (Normal) Collected: 08/17/22 1455    Lab Status: Preliminary result Specimen: Blood from Hand, Right Updated: 08/19/22 1531     Blood Culture No growth at 2 days    Blood Culture - Blood, Hand, Right [[id number]]  (Normal) Collected: 08/17/22 1451    Lab Status: Preliminary result Specimen: Blood from Hand, Right Updated: 08/19/22 1531     Blood Culture No growth at 2 days    CANDIDA AURIS SCREEN - Swab, Axilla Right, Axilla Left and Groin [[id number]]  (Normal) Collected: 08/17/22 0128    Lab Status: Preliminary result Specimen: Swab from Axilla Right, Axilla Left and Groin Updated: 08/19/22 0346     Candida Auris Screen Culture No Candida auris isolated at 2 days    Blood Culture - Blood, Hand, Left [[id number]]  (Abnormal) Collected: 08/16/22 0522    Lab Status: Final result Specimen: Blood from Hand, Left Updated: 08/19/22 0641     Blood Culture Staphylococcus hominis ssp hominis     Isolated from Aerobic Bottle     Gram Stain Aerobic Bottle Gram positive cocci in pairs and clusters    Narrative:      Refer to previous blood culture collected on 8/16/2022 0522 for MICs    Blood Culture - Blood, Hand, Right [[id number]]  (Abnormal)  (Susceptibility) Collected: 08/16/22 0522    Lab Status: Final result Specimen: Blood from Hand, Right Updated: 08/19/22 0641     Blood Culture Staphylococcus hominis ssp hominis     Isolated from Aerobic and Anaerobic Bottles     Gram Stain Aerobic Bottle Gram positive cocci in pairs and clusters      Anaerobic Bottle Gram positive cocci in pairs and clusters    Susceptibility      Staphylococcus hominis ssp hominis      MONICA      Oxacillin Resistant     Vancomycin Susceptible                       Susceptibility Comments     Staphylococcus hominis ssp hominis    This isolate does not demonstrate inducible clindamycin resistance in vitro.                Blood Culture ID, PCR - Blood, Hand, Right [[id number]]  (Abnormal) Collected: 08/16/22 0522    Lab Status: Final result Specimen: Blood from Hand, Right Updated: 08/17/22 0417     BCID, PCR Staph spp, not aureus or lugdunesis. Identification by BCID2 PCR.     BOTTLE TYPE Aerobic Bottle    Urine Culture - Urine, Urine, Catheter [[id number]] Collected: 08/16/22 0337    Lab Status: Final result Specimen: Urine, Catheter Updated: 08/18/22 1522     Urine Culture >100,000 CFU/mL Mixed Jessica Isolated    Narrative:      Specimen contains mixed organisms of questionable pathogenicity suggestive of contamination. If symptoms persist, suggest recollection.  Colonization of the urinary tract without infection is common. Treatment is discouraged unless the patient is symptomatic, pregnant, or undergoing an invasive urologic procedure.    Legionella Antigen, Urine - Urine, Urine, Clean Catch [[id number]]  (Normal) Collected: 08/16/22 0337    Lab Status: Final result Specimen: Urine, Clean Catch Updated: 08/16/22 0651     LEGIONELLA ANTIGEN, URINE Negative    S. Pneumo Ag Urine or CSF - Urine, Urine, Clean Catch [[id number]]  (Normal) Collected: 08/16/22 0337    Lab Status: Final result Specimen: Urine, Clean Catch Updated: 08/16/22 0651     Strep Pneumo Ag Negative    Respiratory Panel PCR w/COVID-19(SARS-CoV-2) [facility] In-House, NP Swab in UTM/VTM, 3-4 HR TAT - Swab, Nasopharynx [[id number]]  (Normal) Collected: 08/16/22 0330    Lab Status: Final result Specimen: Swab from Nasopharynx Updated: 08/16/22 0441     ADENOVIRUS, PCR Not Detected     Coronavirus 229E Not Detected     Coronavirus HKU1 Not Detected     Coronavirus NL63 Not Detected     Coronavirus OC43 Not Detected     COVID19 Not Detected     Human Metapneumovirus Not Detected     Human Rhinovirus/Enterovirus Not Detected     Influenza A PCR Not Detected     Influenza B PCR Not Detected     Parainfluenza Virus 1 Not Detected     Parainfluenza  Virus 2 Not Detected     Parainfluenza Virus 3 Not Detected     Parainfluenza Virus 4 Not Detected     RSV, PCR Not Detected     Bordetella pertussis pcr Not Detected     Bordetella parapertussis PCR Not Detected     Chlamydophila pneumoniae PCR Not Detected     Mycoplasma pneumo by PCR Not Detected    Narrative:      In the setting of a positive respiratory panel with a viral infection PLUS a negative procalcitonin without other underlying concern for bacterial infection, consider observing off antibiotics or discontinuation of antibiotics and continue supportive care. If the respiratory panel is positive for atypical bacterial infection (Bordetella pertussis, Chlamydophila pneumoniae, or Mycoplasma pneumoniae), consider antibiotic de-escalation to target atypical bacterial infection.          Medication Review:       Schedule Meds  bisacodyl, 10 mg, Rectal, Daily  cefTRIAXone, 1 g, Intravenous, Q24H  enoxaparin, 40 mg, Subcutaneous, Daily  pantoprazole, 40 mg, Oral, Q AM  polyethylene glycol, 17 g, Oral, Daily  senna-docusate sodium, 1 tablet, Oral, BID  sodium chloride, 10 mL, Intravenous, Q12H        Infusion Meds       PRN Meds  •  acetaminophen **OR** acetaminophen **OR** acetaminophen  •  aluminum-magnesium hydroxide-simethicone  •  bisacodyl  •  ipratropium-albuterol  •  melatonin  •  nitroglycerin  •  ondansetron **OR** ondansetron  •  sodium chloride  •  sodium chloride        Assessment & Plan       Antimicrobial Therapy   1.  IV Rocephin        2.        3.        4.        5.            Assessment     Possible urinary tract infection with urinalysis showing trace bacteria and pyuria.  However the cultures from a suprapubic catheter which can often be colonization.  Patient denies any flank pain or fever or chills.  -Cultures showed mixed shantal  -CT did not show any obstructive uropathy     Patient apparently was admitted for chest pain but denies ever having chest pain or having any current chest  pain     Positive blood cultures in 2 out of 2 samples for Staphylococcus not aureus.  However these appear to be taken at the same time.  -Repeat blood cultures are negative so far     Concern for left lower lobe pneumonia.  Patient is currently on 5 L of oxygen by nasal cannula.  Appears to be on some oxygen at his facility but does not know how many liters  -Respiratory viral DNA panel, COVID-19 screen, Legionella pneumococcal urine antigen are all negative  -CT showed some left lower lobe pneumonia  -MRSA the nares screen is negative  -Patient states that he is on 2 L of oxygen at baseline when he does wear oxygen     History of paraplegia with suprapubic catheter placement from the 1970s.     Tobacco abuse     Plan     Discontinue IV Rocephin for now  Start p.o. Omnicef 300 mg twice daily for 7 days  Continue supportive care  Okay to discharge from Infectious Disease standpoint  Not much more to add from infectious disease standpoint-we will sign off at this time-please call with any questions.    Indigo Oseguera, APRN  08/19/22  15:47 EDT    Note is dictated utilizing voice recognition software/Dragon

## 2022-08-20 NOTE — OUTREACH NOTE
Prep Survey    Flowsheet Row Responses   [facility] facility patient discharged from? [facility]   Is LACE score < 7 ? No   Emergency Room discharge w/ pulse ox? No   Eligibility Readm Mgmt   Discharge diagnosis Acute hypoxemic respiratory failure    Does the patient have one of the following disease processes/diagnoses(primary or secondary)? CHF   Does the patient have Home health ordered? Yes   What is the Home health agency?  [facility]    Is there a DME ordered? No   Prep survey completed? Yes          JOHNNY CAMACHO - Registered Nurse

## 2022-08-22 LAB
BACTERIA ISLT: NORMAL
BACTERIA SPEC AEROBE CULT: NORMAL
BACTERIA SPEC AEROBE CULT: NORMAL

## 2022-08-22 NOTE — CASE MANAGEMENT/SOCIAL WORK
Case Management Discharge Note      Final Note: [facility]    Provided Post Acute Provider List?: N/A  N/A Provider List Comment: Lives at [facility] and sister wants him to return there    Selected Continued Care - Discharged on 8/19/2022 Admission date: 8/16/2022 - Discharge disposition: Home or Self Care            Transportation Services  Ambulance: [facility]    Final Discharge Disposition Code: 04 - intermediate care facility
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
## 2022-08-23 ENCOUNTER — READMISSION MANAGEMENT (OUTPATIENT)
Dept: CALL CENTER | Facility: HOSPITAL | Age: 67
End: 2022-08-23

## 2022-08-23 NOTE — OUTREACH NOTE
CHF Week 1 Survey    Flowsheet Row Responses   [facility] facility patient discharged from? [facility]   Does the patient have one of the following disease processes/diagnoses(primary or secondary)? CHF   CHF Week 1 attempt successful? Yes   Call start time 1641   Revoke Change in health status-moved to LTC/SNF/Hospice  [Patient has returned to [facility]. ]   Call end time 1642   Is patient permission given to speak with other caregiver? Yes   List who call center can speak with Perlita Beltran Sister    Person spoke with today (if not patient) and relationship Perlita Beltran Sister           MEHUL F - Registered Nurse